# Patient Record
Sex: MALE | Race: OTHER | NOT HISPANIC OR LATINO
[De-identification: names, ages, dates, MRNs, and addresses within clinical notes are randomized per-mention and may not be internally consistent; named-entity substitution may affect disease eponyms.]

---

## 2020-08-05 DIAGNOSIS — R06.89 OTHER ABNORMALITIES OF BREATHING: ICD-10-CM

## 2020-08-05 DIAGNOSIS — Z78.9 OTHER SPECIFIED HEALTH STATUS: ICD-10-CM

## 2020-08-05 RX ORDER — CHROMIUM 200 MCG
TABLET ORAL
Refills: 0 | Status: ACTIVE | COMMUNITY

## 2020-08-05 RX ORDER — ENALAPRIL MALEATE 2.5 MG/1
2.5 TABLET ORAL DAILY
Refills: 0 | Status: ACTIVE | COMMUNITY

## 2020-08-05 RX ORDER — ASPIRIN 81 MG
81 TABLET, DELAYED RELEASE (ENTERIC COATED) ORAL DAILY
Refills: 0 | Status: ACTIVE | COMMUNITY

## 2020-08-05 RX ORDER — METFORMIN HYDROCHLORIDE 1000 MG/1
1000 TABLET, COATED ORAL TWICE DAILY
Refills: 0 | Status: ACTIVE | COMMUNITY

## 2020-08-05 RX ORDER — LEVOTHYROXINE SODIUM 0.05 MG/1
50 TABLET ORAL DAILY
Refills: 0 | Status: ACTIVE | COMMUNITY

## 2020-08-05 RX ORDER — ATORVASTATIN CALCIUM 40 MG/1
40 TABLET, FILM COATED ORAL DAILY
Refills: 0 | Status: ACTIVE | COMMUNITY

## 2020-08-06 ENCOUNTER — NON-APPOINTMENT (OUTPATIENT)
Age: 60
End: 2020-08-06

## 2020-08-06 ENCOUNTER — APPOINTMENT (OUTPATIENT)
Dept: CARDIOLOGY | Facility: CLINIC | Age: 60
End: 2020-08-06
Payer: COMMERCIAL

## 2020-08-06 VITALS
DIASTOLIC BLOOD PRESSURE: 76 MMHG | RESPIRATION RATE: 16 BRPM | TEMPERATURE: 97.4 F | BODY MASS INDEX: 22.96 KG/M2 | HEIGHT: 69 IN | HEART RATE: 69 BPM | WEIGHT: 155 LBS | OXYGEN SATURATION: 100 % | SYSTOLIC BLOOD PRESSURE: 125 MMHG

## 2020-08-06 DIAGNOSIS — E11.9 TYPE 2 DIABETES MELLITUS W/OUT COMPLICATIONS: ICD-10-CM

## 2020-08-06 DIAGNOSIS — Z87.898 PERSONAL HISTORY OF OTHER SPECIFIED CONDITIONS: ICD-10-CM

## 2020-08-06 DIAGNOSIS — Z13.6 ENCOUNTER FOR SCREENING FOR CARDIOVASCULAR DISORDERS: ICD-10-CM

## 2020-08-06 DIAGNOSIS — E78.5 HYPERLIPIDEMIA, UNSPECIFIED: ICD-10-CM

## 2020-08-06 DIAGNOSIS — Z00.00 ENCOUNTER FOR GENERAL ADULT MEDICAL EXAMINATION W/OUT ABNORMAL FINDINGS: ICD-10-CM

## 2020-08-06 DIAGNOSIS — I10 ESSENTIAL (PRIMARY) HYPERTENSION: ICD-10-CM

## 2020-08-06 PROCEDURE — 99205 OFFICE O/P NEW HI 60 MIN: CPT

## 2020-08-06 PROCEDURE — 93000 ELECTROCARDIOGRAM COMPLETE: CPT

## 2020-08-09 PROBLEM — Z00.00 ENCOUNTER FOR PREVENTIVE HEALTH EXAMINATION: Status: ACTIVE | Noted: 2020-07-31

## 2020-08-09 PROBLEM — I10 BENIGN HYPERTENSION: Status: ACTIVE | Noted: 2020-08-09

## 2020-08-09 PROBLEM — E11.9 DIABETES: Status: ACTIVE | Noted: 2020-08-05

## 2020-08-09 PROBLEM — E78.5 HYPERLIPIDEMIA: Status: ACTIVE | Noted: 2020-08-09

## 2020-08-09 PROBLEM — Z13.6 SCREENING FOR CARDIOVASCULAR CONDITION: Status: ACTIVE | Noted: 2020-08-09

## 2020-08-09 PROBLEM — Z87.898 H/O SHORTNESS OF BREATH: Status: ACTIVE | Noted: 2020-08-05

## 2020-08-27 ENCOUNTER — APPOINTMENT (OUTPATIENT)
Dept: CV DIAGNOSTICS | Facility: HOSPITAL | Age: 60
End: 2020-08-27

## 2020-08-27 ENCOUNTER — APPOINTMENT (OUTPATIENT)
Dept: CV DIAGNOSITCS | Facility: HOSPITAL | Age: 60
End: 2020-08-27

## 2021-08-13 ENCOUNTER — INPATIENT (INPATIENT)
Facility: HOSPITAL | Age: 61
LOS: 4 days | Discharge: HOME | DRG: 643 | End: 2021-08-18
Attending: STUDENT IN AN ORGANIZED HEALTH CARE EDUCATION/TRAINING PROGRAM | Admitting: STUDENT IN AN ORGANIZED HEALTH CARE EDUCATION/TRAINING PROGRAM
Payer: COMMERCIAL

## 2021-08-13 VITALS
HEART RATE: 73 BPM | RESPIRATION RATE: 17 BRPM | HEIGHT: 70 IN | TEMPERATURE: 98 F | SYSTOLIC BLOOD PRESSURE: 128 MMHG | WEIGHT: 145.95 LBS | OXYGEN SATURATION: 98 % | DIASTOLIC BLOOD PRESSURE: 67 MMHG

## 2021-08-13 DIAGNOSIS — E87.1 HYPO-OSMOLALITY AND HYPONATREMIA: ICD-10-CM

## 2021-08-13 LAB
ALBUMIN SERPL ELPH-MCNC: 3 G/DL — LOW (ref 3.3–5)
ALP SERPL-CCNC: 84 U/L — SIGNIFICANT CHANGE UP (ref 40–120)
ALT FLD-CCNC: 36 U/L — SIGNIFICANT CHANGE UP (ref 12–78)
ANION GAP SERPL CALC-SCNC: 8 MMOL/L — SIGNIFICANT CHANGE UP (ref 5–17)
APPEARANCE UR: CLEAR — SIGNIFICANT CHANGE UP
AST SERPL-CCNC: 28 U/L — SIGNIFICANT CHANGE UP (ref 15–37)
BACTERIA # UR AUTO: ABNORMAL
BASOPHILS # BLD AUTO: 0.06 K/UL — SIGNIFICANT CHANGE UP (ref 0–0.2)
BASOPHILS NFR BLD AUTO: 0.9 % — SIGNIFICANT CHANGE UP (ref 0–2)
BILIRUB SERPL-MCNC: 0.5 MG/DL — SIGNIFICANT CHANGE UP (ref 0.2–1.2)
BILIRUB UR-MCNC: NEGATIVE — SIGNIFICANT CHANGE UP
BUN SERPL-MCNC: 11 MG/DL — SIGNIFICANT CHANGE UP (ref 7–23)
CALCIUM SERPL-MCNC: 7.9 MG/DL — LOW (ref 8.5–10.1)
CHLORIDE SERPL-SCNC: 87 MMOL/L — LOW (ref 96–108)
CO2 SERPL-SCNC: 21 MMOL/L — LOW (ref 22–31)
COLOR SPEC: YELLOW — SIGNIFICANT CHANGE UP
CREAT SERPL-MCNC: 0.54 MG/DL — SIGNIFICANT CHANGE UP (ref 0.5–1.3)
DIFF PNL FLD: ABNORMAL
EOSINOPHIL # BLD AUTO: 0.45 K/UL — SIGNIFICANT CHANGE UP (ref 0–0.5)
EOSINOPHIL NFR BLD AUTO: 6.9 % — HIGH (ref 0–6)
EPI CELLS # UR: SIGNIFICANT CHANGE UP
GLUCOSE SERPL-MCNC: 130 MG/DL — HIGH (ref 70–99)
GLUCOSE UR QL: NEGATIVE — SIGNIFICANT CHANGE UP
HCT VFR BLD CALC: 28.6 % — LOW (ref 39–50)
HGB BLD-MCNC: 10.5 G/DL — LOW (ref 13–17)
IMM GRANULOCYTES NFR BLD AUTO: 0.3 % — SIGNIFICANT CHANGE UP (ref 0–1.5)
KETONES UR-MCNC: NEGATIVE — SIGNIFICANT CHANGE UP
LEUKOCYTE ESTERASE UR-ACNC: NEGATIVE — SIGNIFICANT CHANGE UP
LIDOCAIN IGE QN: 65 U/L — LOW (ref 73–393)
LYMPHOCYTES # BLD AUTO: 2.98 K/UL — SIGNIFICANT CHANGE UP (ref 1–3.3)
LYMPHOCYTES # BLD AUTO: 45.9 % — HIGH (ref 13–44)
MCHC RBC-ENTMCNC: 28.5 PG — SIGNIFICANT CHANGE UP (ref 27–34)
MCHC RBC-ENTMCNC: 36.7 GM/DL — HIGH (ref 32–36)
MCV RBC AUTO: 77.7 FL — LOW (ref 80–100)
MONOCYTES # BLD AUTO: 0.7 K/UL — SIGNIFICANT CHANGE UP (ref 0–0.9)
MONOCYTES NFR BLD AUTO: 10.8 % — SIGNIFICANT CHANGE UP (ref 2–14)
NEUTROPHILS # BLD AUTO: 2.28 K/UL — SIGNIFICANT CHANGE UP (ref 1.8–7.4)
NEUTROPHILS NFR BLD AUTO: 35.2 % — LOW (ref 43–77)
NITRITE UR-MCNC: NEGATIVE — SIGNIFICANT CHANGE UP
NRBC # BLD: 0 /100 WBCS — SIGNIFICANT CHANGE UP (ref 0–0)
PH UR: 6.5 — SIGNIFICANT CHANGE UP (ref 5–8)
PLATELET # BLD AUTO: 213 K/UL — SIGNIFICANT CHANGE UP (ref 150–400)
POTASSIUM SERPL-MCNC: 3.9 MMOL/L — SIGNIFICANT CHANGE UP (ref 3.5–5.3)
POTASSIUM SERPL-SCNC: 3.9 MMOL/L — SIGNIFICANT CHANGE UP (ref 3.5–5.3)
PROT SERPL-MCNC: 6.8 G/DL — SIGNIFICANT CHANGE UP (ref 6–8.3)
PROT UR-MCNC: NEGATIVE — SIGNIFICANT CHANGE UP
RBC # BLD: 3.68 M/UL — LOW (ref 4.2–5.8)
RBC # FLD: 12.7 % — SIGNIFICANT CHANGE UP (ref 10.3–14.5)
RBC CASTS # UR COMP ASSIST: SIGNIFICANT CHANGE UP /HPF (ref 0–4)
SODIUM SERPL-SCNC: 116 MMOL/L — CRITICAL LOW (ref 135–145)
SP GR SPEC: 1.01 — SIGNIFICANT CHANGE UP (ref 1.01–1.02)
UROBILINOGEN FLD QL: NEGATIVE — SIGNIFICANT CHANGE UP
WBC # BLD: 6.49 K/UL — SIGNIFICANT CHANGE UP (ref 3.8–10.5)
WBC # FLD AUTO: 6.49 K/UL — SIGNIFICANT CHANGE UP (ref 3.8–10.5)
WBC UR QL: SIGNIFICANT CHANGE UP

## 2021-08-13 PROCEDURE — 99285 EMERGENCY DEPT VISIT HI MDM: CPT

## 2021-08-13 PROCEDURE — 99223 1ST HOSP IP/OBS HIGH 75: CPT | Mod: GC

## 2021-08-13 PROCEDURE — 71046 X-RAY EXAM CHEST 2 VIEWS: CPT | Mod: 26

## 2021-08-13 PROCEDURE — 93010 ELECTROCARDIOGRAM REPORT: CPT

## 2021-08-13 RX ORDER — FAMOTIDINE 10 MG/ML
20 INJECTION INTRAVENOUS ONCE
Refills: 0 | Status: COMPLETED | OUTPATIENT
Start: 2021-08-13 | End: 2021-08-13

## 2021-08-13 RX ORDER — SODIUM CHLORIDE 9 MG/ML
1000 INJECTION INTRAMUSCULAR; INTRAVENOUS; SUBCUTANEOUS ONCE
Refills: 0 | Status: COMPLETED | OUTPATIENT
Start: 2021-08-13 | End: 2021-08-13

## 2021-08-13 RX ORDER — ONDANSETRON 8 MG/1
4 TABLET, FILM COATED ORAL ONCE
Refills: 0 | Status: COMPLETED | OUTPATIENT
Start: 2021-08-13 | End: 2021-08-13

## 2021-08-13 RX ADMIN — SODIUM CHLORIDE 1000 MILLILITER(S): 9 INJECTION INTRAMUSCULAR; INTRAVENOUS; SUBCUTANEOUS at 22:04

## 2021-08-13 RX ADMIN — FAMOTIDINE 20 MILLIGRAM(S): 10 INJECTION INTRAVENOUS at 22:03

## 2021-08-13 RX ADMIN — ONDANSETRON 4 MILLIGRAM(S): 8 TABLET, FILM COATED ORAL at 22:03

## 2021-08-13 NOTE — ED PROVIDER NOTE - PROGRESS NOTE DETAILS
All results were explained to patient and/or family and a copy of all available results given.  case dw Magaly

## 2021-08-13 NOTE — ED PROVIDER NOTE - OBJECTIVE STATEMENT
Pt wants wife to translate.  Pt was started on Reglan and Pepcid last night urgent care.  Decreased PO intake x 3 days.  Nausea, but no vomiting.  Watery diarrhea x 6 today x yesterday.  No sick contacts, recent ab, hospitalization, travel outside [place].   No prior episodes.

## 2021-08-14 DIAGNOSIS — G93.41 METABOLIC ENCEPHALOPATHY: ICD-10-CM

## 2021-08-14 DIAGNOSIS — E11.9 TYPE 2 DIABETES MELLITUS WITHOUT COMPLICATIONS: ICD-10-CM

## 2021-08-14 DIAGNOSIS — R41.82 ALTERED MENTAL STATUS, UNSPECIFIED: ICD-10-CM

## 2021-08-14 DIAGNOSIS — D50.9 IRON DEFICIENCY ANEMIA, UNSPECIFIED: ICD-10-CM

## 2021-08-14 DIAGNOSIS — R19.7 DIARRHEA, UNSPECIFIED: ICD-10-CM

## 2021-08-14 DIAGNOSIS — D64.9 ANEMIA, UNSPECIFIED: ICD-10-CM

## 2021-08-14 DIAGNOSIS — E03.9 HYPOTHYROIDISM, UNSPECIFIED: ICD-10-CM

## 2021-08-14 DIAGNOSIS — E87.1 HYPO-OSMOLALITY AND HYPONATREMIA: ICD-10-CM

## 2021-08-14 DIAGNOSIS — Z29.9 ENCOUNTER FOR PROPHYLACTIC MEASURES, UNSPECIFIED: ICD-10-CM

## 2021-08-14 LAB
ALBUMIN SERPL ELPH-MCNC: 3.3 G/DL — SIGNIFICANT CHANGE UP (ref 3.3–5)
ALP SERPL-CCNC: 86 U/L — SIGNIFICANT CHANGE UP (ref 40–120)
ALT FLD-CCNC: 37 U/L — SIGNIFICANT CHANGE UP (ref 12–78)
ANION GAP SERPL CALC-SCNC: 11 MMOL/L — SIGNIFICANT CHANGE UP (ref 5–17)
ANION GAP SERPL CALC-SCNC: 8 MMOL/L — SIGNIFICANT CHANGE UP (ref 5–17)
ANION GAP SERPL CALC-SCNC: 9 MMOL/L — SIGNIFICANT CHANGE UP (ref 5–17)
AST SERPL-CCNC: 35 U/L — SIGNIFICANT CHANGE UP (ref 15–37)
BILIRUB SERPL-MCNC: 0.8 MG/DL — SIGNIFICANT CHANGE UP (ref 0.2–1.2)
BUN SERPL-MCNC: 10 MG/DL — SIGNIFICANT CHANGE UP (ref 7–23)
BUN SERPL-MCNC: 8 MG/DL — SIGNIFICANT CHANGE UP (ref 7–23)
BUN SERPL-MCNC: 9 MG/DL — SIGNIFICANT CHANGE UP (ref 7–23)
CALCIUM SERPL-MCNC: 7.9 MG/DL — LOW (ref 8.5–10.1)
CALCIUM SERPL-MCNC: 7.9 MG/DL — LOW (ref 8.5–10.1)
CALCIUM SERPL-MCNC: 8 MG/DL — LOW (ref 8.5–10.1)
CALCIUM SERPL-MCNC: 8 MG/DL — LOW (ref 8.5–10.1)
CALCIUM SERPL-MCNC: 8.1 MG/DL — LOW (ref 8.5–10.1)
CHLORIDE SERPL-SCNC: 89 MMOL/L — LOW (ref 96–108)
CHLORIDE SERPL-SCNC: 91 MMOL/L — LOW (ref 96–108)
CHLORIDE UR-SCNC: 83 MMOL/L — SIGNIFICANT CHANGE UP
CO2 SERPL-SCNC: 19 MMOL/L — LOW (ref 22–31)
CO2 SERPL-SCNC: 22 MMOL/L — SIGNIFICANT CHANGE UP (ref 22–31)
CREAT SERPL-MCNC: 0.51 MG/DL — SIGNIFICANT CHANGE UP (ref 0.5–1.3)
CREAT SERPL-MCNC: 0.59 MG/DL — SIGNIFICANT CHANGE UP (ref 0.5–1.3)
CREAT SERPL-MCNC: 0.61 MG/DL — SIGNIFICANT CHANGE UP (ref 0.5–1.3)
CREAT SERPL-MCNC: 0.72 MG/DL — SIGNIFICANT CHANGE UP (ref 0.5–1.3)
CREAT SERPL-MCNC: 0.8 MG/DL — SIGNIFICANT CHANGE UP (ref 0.5–1.3)
GLUCOSE SERPL-MCNC: 103 MG/DL — HIGH (ref 70–99)
GLUCOSE SERPL-MCNC: 78 MG/DL — SIGNIFICANT CHANGE UP (ref 70–99)
GLUCOSE SERPL-MCNC: 94 MG/DL — SIGNIFICANT CHANGE UP (ref 70–99)
GLUCOSE SERPL-MCNC: 96 MG/DL — SIGNIFICANT CHANGE UP (ref 70–99)
GLUCOSE SERPL-MCNC: 99 MG/DL — SIGNIFICANT CHANGE UP (ref 70–99)
HCT VFR BLD CALC: 30.2 % — LOW (ref 39–50)
HCV AB S/CO SERPL IA: 0.12 S/CO — SIGNIFICANT CHANGE UP (ref 0–0.99)
HCV AB SERPL-IMP: SIGNIFICANT CHANGE UP
HGB BLD-MCNC: 11.2 G/DL — LOW (ref 13–17)
LACTATE SERPL-SCNC: 0.5 MMOL/L — LOW (ref 0.7–2)
MAGNESIUM SERPL-MCNC: 2 MG/DL — SIGNIFICANT CHANGE UP (ref 1.6–2.6)
MCHC RBC-ENTMCNC: 28.9 PG — SIGNIFICANT CHANGE UP (ref 27–34)
MCHC RBC-ENTMCNC: 37.1 GM/DL — HIGH (ref 32–36)
MCV RBC AUTO: 77.8 FL — LOW (ref 80–100)
NRBC # BLD: 0 /100 WBCS — SIGNIFICANT CHANGE UP (ref 0–0)
OSMOLALITY SERPL: 239 MOSMOL/KG — LOW (ref 280–301)
OSMOLALITY UR: 248 MOSM/KG — SIGNIFICANT CHANGE UP (ref 50–1200)
PHOSPHATE SERPL-MCNC: 2.4 MG/DL — LOW (ref 2.5–4.5)
PLATELET # BLD AUTO: 234 K/UL — SIGNIFICANT CHANGE UP (ref 150–400)
POTASSIUM SERPL-MCNC: 3.3 MMOL/L — LOW (ref 3.5–5.3)
POTASSIUM SERPL-MCNC: 3.5 MMOL/L — SIGNIFICANT CHANGE UP (ref 3.5–5.3)
POTASSIUM SERPL-MCNC: 3.5 MMOL/L — SIGNIFICANT CHANGE UP (ref 3.5–5.3)
POTASSIUM SERPL-MCNC: 3.7 MMOL/L — SIGNIFICANT CHANGE UP (ref 3.5–5.3)
POTASSIUM SERPL-MCNC: 3.8 MMOL/L — SIGNIFICANT CHANGE UP (ref 3.5–5.3)
POTASSIUM SERPL-SCNC: 3.3 MMOL/L — LOW (ref 3.5–5.3)
POTASSIUM SERPL-SCNC: 3.5 MMOL/L — SIGNIFICANT CHANGE UP (ref 3.5–5.3)
POTASSIUM SERPL-SCNC: 3.5 MMOL/L — SIGNIFICANT CHANGE UP (ref 3.5–5.3)
POTASSIUM SERPL-SCNC: 3.7 MMOL/L — SIGNIFICANT CHANGE UP (ref 3.5–5.3)
POTASSIUM SERPL-SCNC: 3.8 MMOL/L — SIGNIFICANT CHANGE UP (ref 3.5–5.3)
PROT SERPL-MCNC: 7 G/DL — SIGNIFICANT CHANGE UP (ref 6–8.3)
RAPID RVP RESULT: DETECTED
RBC # BLD: 3.88 M/UL — LOW (ref 4.2–5.8)
RBC # FLD: 12.6 % — SIGNIFICANT CHANGE UP (ref 10.3–14.5)
RV+EV RNA SPEC QL NAA+PROBE: DETECTED
SARS-COV-2 RNA SPEC QL NAA+PROBE: SIGNIFICANT CHANGE UP
SODIUM SERPL-SCNC: 119 MMOL/L — CRITICAL LOW (ref 135–145)
SODIUM SERPL-SCNC: 120 MMOL/L — CRITICAL LOW (ref 135–145)
SODIUM SERPL-SCNC: 121 MMOL/L — LOW (ref 135–145)
SODIUM UR-SCNC: 97 MMOL/L — SIGNIFICANT CHANGE UP
TSH SERPL-MCNC: 2.32 UIU/ML — SIGNIFICANT CHANGE UP (ref 0.36–3.74)
URATE SERPL-MCNC: 2.6 MG/DL — LOW (ref 3.4–8.8)
URATE UR-MCNC: 7.5 MG/DL — SIGNIFICANT CHANGE UP
WBC # BLD: 7 K/UL — SIGNIFICANT CHANGE UP (ref 3.8–10.5)
WBC # FLD AUTO: 7 K/UL — SIGNIFICANT CHANGE UP (ref 3.8–10.5)

## 2021-08-14 PROCEDURE — 70450 CT HEAD/BRAIN W/O DYE: CPT | Mod: 26

## 2021-08-14 PROCEDURE — 99291 CRITICAL CARE FIRST HOUR: CPT

## 2021-08-14 PROCEDURE — 99233 SBSQ HOSP IP/OBS HIGH 50: CPT | Mod: GC

## 2021-08-14 RX ORDER — SODIUM CHLORIDE 9 MG/ML
1000 INJECTION, SOLUTION INTRAVENOUS
Refills: 0 | Status: DISCONTINUED | OUTPATIENT
Start: 2021-08-14 | End: 2021-08-18

## 2021-08-14 RX ORDER — POTASSIUM PHOSPHATE, MONOBASIC POTASSIUM PHOSPHATE, DIBASIC 236; 224 MG/ML; MG/ML
15 INJECTION, SOLUTION INTRAVENOUS ONCE
Refills: 0 | Status: COMPLETED | OUTPATIENT
Start: 2021-08-14 | End: 2021-08-14

## 2021-08-14 RX ORDER — DEXTROSE 50 % IN WATER 50 %
25 SYRINGE (ML) INTRAVENOUS ONCE
Refills: 0 | Status: DISCONTINUED | OUTPATIENT
Start: 2021-08-14 | End: 2021-08-18

## 2021-08-14 RX ORDER — ACETAMINOPHEN 500 MG
975 TABLET ORAL ONCE
Refills: 0 | Status: COMPLETED | OUTPATIENT
Start: 2021-08-14 | End: 2021-08-14

## 2021-08-14 RX ORDER — LEVOTHYROXINE SODIUM 125 MCG
75 TABLET ORAL DAILY
Refills: 0 | Status: DISCONTINUED | OUTPATIENT
Start: 2021-08-14 | End: 2021-08-15

## 2021-08-14 RX ORDER — INSULIN LISPRO 100/ML
VIAL (ML) SUBCUTANEOUS
Refills: 0 | Status: DISCONTINUED | OUTPATIENT
Start: 2021-08-14 | End: 2021-08-18

## 2021-08-14 RX ORDER — METFORMIN HYDROCHLORIDE 850 MG/1
1 TABLET ORAL
Qty: 0 | Refills: 0 | DISCHARGE

## 2021-08-14 RX ORDER — GLUCAGON INJECTION, SOLUTION 0.5 MG/.1ML
1 INJECTION, SOLUTION SUBCUTANEOUS ONCE
Refills: 0 | Status: DISCONTINUED | OUTPATIENT
Start: 2021-08-14 | End: 2021-08-18

## 2021-08-14 RX ORDER — DESMOPRESSIN ACETATE 0.1 MG/1
2 TABLET ORAL ONCE
Refills: 0 | Status: COMPLETED | OUTPATIENT
Start: 2021-08-14 | End: 2021-08-14

## 2021-08-14 RX ORDER — FERROUS SULFATE 325(65) MG
1 TABLET ORAL
Qty: 0 | Refills: 0 | DISCHARGE

## 2021-08-14 RX ORDER — SODIUM CHLORIDE 9 MG/ML
2 INJECTION INTRAMUSCULAR; INTRAVENOUS; SUBCUTANEOUS EVERY 6 HOURS
Refills: 0 | Status: DISCONTINUED | OUTPATIENT
Start: 2021-08-14 | End: 2021-08-14

## 2021-08-14 RX ORDER — SODIUM CHLORIDE 9 MG/ML
1000 INJECTION INTRAMUSCULAR; INTRAVENOUS; SUBCUTANEOUS
Refills: 0 | Status: DISCONTINUED | OUTPATIENT
Start: 2021-08-14 | End: 2021-08-14

## 2021-08-14 RX ORDER — ENOXAPARIN SODIUM 100 MG/ML
40 INJECTION SUBCUTANEOUS DAILY
Refills: 0 | Status: DISCONTINUED | OUTPATIENT
Start: 2021-08-14 | End: 2021-08-18

## 2021-08-14 RX ORDER — POTASSIUM CHLORIDE 20 MEQ
10 PACKET (EA) ORAL
Refills: 0 | Status: COMPLETED | OUTPATIENT
Start: 2021-08-14 | End: 2021-08-15

## 2021-08-14 RX ORDER — SODIUM CHLORIDE 5 G/100ML
500 INJECTION, SOLUTION INTRAVENOUS
Refills: 0 | Status: DISCONTINUED | OUTPATIENT
Start: 2021-08-14 | End: 2021-08-15

## 2021-08-14 RX ORDER — DEXMEDETOMIDINE HYDROCHLORIDE IN 0.9% SODIUM CHLORIDE 4 UG/ML
0.2 INJECTION INTRAVENOUS
Qty: 200 | Refills: 0 | Status: DISCONTINUED | OUTPATIENT
Start: 2021-08-14 | End: 2021-08-14

## 2021-08-14 RX ORDER — ACETAMINOPHEN 500 MG
650 TABLET ORAL ONCE
Refills: 0 | Status: COMPLETED | OUTPATIENT
Start: 2021-08-14 | End: 2021-08-14

## 2021-08-14 RX ORDER — SODIUM CHLORIDE 9 MG/ML
1000 INJECTION, SOLUTION INTRAVENOUS
Refills: 0 | Status: DISCONTINUED | OUTPATIENT
Start: 2021-08-14 | End: 2021-08-14

## 2021-08-14 RX ORDER — DEXTROSE 50 % IN WATER 50 %
12.5 SYRINGE (ML) INTRAVENOUS ONCE
Refills: 0 | Status: DISCONTINUED | OUTPATIENT
Start: 2021-08-14 | End: 2021-08-18

## 2021-08-14 RX ORDER — PREGABALIN 225 MG/1
1 CAPSULE ORAL
Qty: 0 | Refills: 0 | DISCHARGE

## 2021-08-14 RX ORDER — DEXTROSE 50 % IN WATER 50 %
15 SYRINGE (ML) INTRAVENOUS ONCE
Refills: 0 | Status: DISCONTINUED | OUTPATIENT
Start: 2021-08-14 | End: 2021-08-18

## 2021-08-14 RX ADMIN — Medication 0.25 MILLIGRAM(S): at 00:44

## 2021-08-14 RX ADMIN — DEXMEDETOMIDINE HYDROCHLORIDE IN 0.9% SODIUM CHLORIDE 3.45 MICROGRAM(S)/KG/HR: 4 INJECTION INTRAVENOUS at 13:22

## 2021-08-14 RX ADMIN — Medication 100 MILLIEQUIVALENT(S): at 23:21

## 2021-08-14 RX ADMIN — ENOXAPARIN SODIUM 40 MILLIGRAM(S): 100 INJECTION SUBCUTANEOUS at 11:40

## 2021-08-14 RX ADMIN — Medication 650 MILLIGRAM(S): at 10:00

## 2021-08-14 RX ADMIN — Medication 650 MILLIGRAM(S): at 19:15

## 2021-08-14 RX ADMIN — POTASSIUM PHOSPHATE, MONOBASIC POTASSIUM PHOSPHATE, DIBASIC 62.5 MILLIMOLE(S): 236; 224 INJECTION, SOLUTION INTRAVENOUS at 23:21

## 2021-08-14 RX ADMIN — SODIUM CHLORIDE 30 MILLILITER(S): 5 INJECTION, SOLUTION INTRAVENOUS at 19:41

## 2021-08-14 RX ADMIN — Medication 1 MILLIGRAM(S): at 12:23

## 2021-08-14 RX ADMIN — DEXMEDETOMIDINE HYDROCHLORIDE IN 0.9% SODIUM CHLORIDE 3.45 MICROGRAM(S)/KG/HR: 4 INJECTION INTRAVENOUS at 15:26

## 2021-08-14 RX ADMIN — DESMOPRESSIN ACETATE 2 MICROGRAM(S): 0.1 TABLET ORAL at 05:41

## 2021-08-14 RX ADMIN — SODIUM CHLORIDE 120 MILLILITER(S): 9 INJECTION INTRAMUSCULAR; INTRAVENOUS; SUBCUTANEOUS at 01:07

## 2021-08-14 RX ADMIN — Medication 650 MILLIGRAM(S): at 09:00

## 2021-08-14 RX ADMIN — Medication 1 MILLIGRAM(S): at 13:38

## 2021-08-14 RX ADMIN — Medication 650 MILLIGRAM(S): at 20:15

## 2021-08-14 RX ADMIN — SODIUM CHLORIDE 50 MILLILITER(S): 9 INJECTION, SOLUTION INTRAVENOUS at 03:01

## 2021-08-14 RX ADMIN — DEXMEDETOMIDINE HYDROCHLORIDE IN 0.9% SODIUM CHLORIDE 3.45 MICROGRAM(S)/KG/HR: 4 INJECTION INTRAVENOUS at 11:40

## 2021-08-14 RX ADMIN — DEXMEDETOMIDINE HYDROCHLORIDE IN 0.9% SODIUM CHLORIDE 3.45 MICROGRAM(S)/KG/HR: 4 INJECTION INTRAVENOUS at 09:53

## 2021-08-14 NOTE — H&P ADULT - NSHPPHYSICALEXAM_GEN_ALL_CORE
T(C): 36.9 (08-13-21 @ 20:43), Max: 36.9 (08-13-21 @ 20:43)  HR: 80 (08-13-21 @ 23:15) (73 - 80)  BP: 136/71 (08-13-21 @ 23:15) (128/67 - 136/71)  RR: 18 (08-13-21 @ 23:15) (17 - 18)  SpO2: 99% (08-13-21 @ 23:15) (98% - 99%)    GENERAL: patient appears confused, restless--constantly moving and unable to stay still throughout exam  EYES: sclera clear, no exudates  ENMT: oropharynx clear without erythema, no exudates, dry mucous membranes  LUNGS: clear to auscultation bilaterally, no wheezing or rhonchi appreciated  HEART: soft S1/S2, regular rate and rhythm, no murmurs noted, no lower extremity edema  GASTROINTESTINAL: abdomen is soft, nontender, nondistended, normoactive bowel sounds  INTEGUMENT: good skin turgor, warm skin, appears well perfused  MUSCULOSKELETAL: no clubbing or cyanosis, no obvious deformity  NEUROLOGIC: awake, alert, oriented x1-2(Oriented only to person), good muscle tone in all 4 extremities  HEME/LYMPH: no obvious ecchymosis or petechiae T(C): 36.9 (08-13-21 @ 20:43), Max: 36.9 (08-13-21 @ 20:43)  HR: 80 (08-13-21 @ 23:15) (73 - 80)  BP: 136/71 (08-13-21 @ 23:15) (128/67 - 136/71)  RR: 18 (08-13-21 @ 23:15) (17 - 18)  SpO2: 99% (08-13-21 @ 23:15) (98% - 99%)    GENERAL: patient appears confused, restless--constantly moving and unable to stay still throughout exam  EYES: sclera clear, no exudates  ENMT: oropharynx clear without erythema, no exudates, dry mucous membranes  LUNGS: clear to auscultation bilaterally, no wheezing or rhonchi appreciated  HEART: soft S1/S2, regular rate and rhythm, no murmurs noted, no lower extremity edema  GASTROINTESTINAL: abdomen is soft, nontender, nondistended, normoactive bowel sounds  INTEGUMENT: good skin turgor, warm skin, appears well perfused  MUSCULOSKELETAL: no clubbing or cyanosis, no obvious deformity  NEUROLOGIC: awake, alert, oriented x1-2(Oriented only to person), good muscle tone in all 4 extremities; CN II-XII intact b/l  HEME/LYMPH: no obvious ecchymosis or petechiae T(C): 36.9 (08-13-21 @ 20:43), Max: 36.9 (08-13-21 @ 20:43)  HR: 80 (08-13-21 @ 23:15) (73 - 80)  BP: 136/71 (08-13-21 @ 23:15) (128/67 - 136/71)  RR: 18 (08-13-21 @ 23:15) (17 - 18)  SpO2: 99% (08-13-21 @ 23:15) (98% - 99%)    GENERAL: patient appears confused, restless--constantly moving and unable to stay still throughout exam  EYES: sclera clear, no exudates  ENMT: oropharynx clear without erythema, no exudates, dry mucous membranes  LUNGS: clear to auscultation bilaterally, no wheezing or rhonchi appreciated  HEART: soft S1/S2, regular rate and rhythm, no murmurs noted, no lower extremity edema  GASTROINTESTINAL: abdomen is soft, nontender, nondistended, normoactive bowel sounds  INTEGUMENT: good skin turgor, warm skin, appears well perfused  MUSCULOSKELETAL: no clubbing or cyanosis, no obvious deformity  NEUROLOGIC: awake, alert, oriented x1-2(Oriented only to person), good muscle tone in all 4 extremities; no focal deficits  HEME/LYMPH: no obvious ecchymosis or petechiae

## 2021-08-14 NOTE — PROGRESS NOTE ADULT - PROBLEM SELECTOR PLAN 1
pt with 6 episodes of loose, non-formed stool, decreased appetite on admission   - Na 116 on admission, symptomatic hyponatremia likely in setting of overt volume loss and poor PO intake vs. SIADH   - Enterovirus positive--likely etiology of symptoms; contact and droplet precaution   - GI PCR, stool culture, C diff pcr ordered, f/u results   - Na 116-->119-->120 today   - s/p 1L ns bolus x 1 on admission, s/p LR, s/p DDAVP   - Start NaCl tablets q6h x2 doses   - F/u BMP at 20:00   - Avoid rapid correction. Goal correction of 8-10 meq/24 hours.   - Nephro Dr. Zarate following, recs appreciated  - Management as per ICU

## 2021-08-14 NOTE — H&P ADULT - PROBLEM SELECTOR PLAN 1
pt with 6 episodes of loose, non-formed stool, decreased appetite  - Na 116 on admission, admit for hyponatremia likely in setting of overt volume loss and poor PO intake  - s/p 1L ns bolus x 1 on admission, check repeat BMP  - Start LR @100cc/hr  - Nephro consult pt with 6 episodes of loose, non-formed stool, decreased appetite  - Na 116 on admission, admit to ICU for metabolic encephalopathy likely 2/2 hyponatremia likely in setting of overt volume loss and poor PO intake  - Enterovirus positive--likely cause of symptoms  - check GI PCR, stool culture, C diff pcr   - s/p 1L ns bolus x 1 on admission, check repeat BMP  - On LR @100cc/hr  - Management per ICU pt with 6 episodes of loose, non-formed stool, decreased appetite  - Na 116 on admission, admit to ICU for metabolic encephalopathy likely 2/2 hyponatremia likely in setting of overt volume loss and poor PO intake  - Enterovirus positive--likely culprit of symptoms  - check GI PCR, stool culture, C diff pcr   - s/p 1L ns bolus x 1 on admission, check repeat BMP  - On LR @100cc/hr  - Management per ICU pt with 6 episodes of loose, non-formed stool, decreased appetite  - Na 116 on admission, admit to ICU for metabolic encephalopathy likely 2/2 hyponatremia likely in setting of overt volume loss and poor PO intake  - Enterovirus positive--likely culprit of symptoms  - check GI PCR, stool culture, C diff pcr   - s/p 1L ns bolus x 1 on admission, check repeat BMP  - On LR @50cc/hr  - Management per ICU pt with 6 episodes of loose, non-formed stool, decreased appetite  - Na 116 on admission, admit to ICU for symptomatic hyponatremia likely in setting of overt volume loss and poor PO intake  - Enterovirus positive--likely etiology of symptoms  -  GI PCR, stool culture, C diff pcr pending  - s/p 1L ns bolus x 1 on admission, check repeat BMP  - Na increase of 3 pts s/p 1 L NS. c/w gentle hydration. avoid rapid correction.   - check urine lytes/osmol  - On LR @50cc/hr

## 2021-08-14 NOTE — H&P ADULT - HISTORY OF PRESENT ILLNESS
62 y/o M with PMHx of T2DM, Iron deficiency anemia, Hypothyroidism presenting with hyponatremia. History as per wife, Mario Rene at bedside as pt is AAOx1-2(Unsure where is is and the year). Per wife, patient has been "feeling unwell" with  poor PO intake and fatigue for the past 3 days. Today, he reportedly had 6 episodes of loose, brown colored non-formed stool. Denies any recent antibiotic use. No recent travel or sick contacts.     ED Course:  Vitals:  Labs:   62 y/o M with PMHx of T2DM, Iron deficiency anemia, Hypothyroidism presenting with hyponatremia. History as per wife, Mario Rene at bedside as pt is AAOx1-2(Unsure where is is and the year). Per wife, patient has been "feeling unwell" with  poor PO intake and fatigue for the past 3 days. Today, he reportedly had 6 episodes of loose, brown colored non-formed stool. Denies any recent antibiotic use. No recent travel or sick contacts.     ED Course: s/p pepcid 20mg IVP x 1, Zofran 4mg IVP x 1, 1L ns bolus x 1  Vitals: T 98.4, HR 73, /67, O2 98 on RA  Labs:   60 y/o M with PMHx of T2DM, Iron deficiency anemia, Hypothyroidism presenting with hyponatremia. History as per wife, Mario Rene at bedside as pt is AAOx1-2(Unsure where is is and the year). Wife knows this is not the patient's baseline and believes he is confused/agitated because "he just wants to go home". Per wife, patient has been "feeling unwell" with nausea, poor PO intake, and fatigue for the past 3 days. Pt had visited an urgent care and was prescribed some pepcid and reglan, which provided mild relief. Today, he reportedly had 6 episodes of loose, brown colored non-formed stool. Denies any recent antibiotic use. No recent travel or sick contacts.     ED Course: s/p pepcid 20mg IVP x 1, Zofran 4mg IVP x 1, 1L ns bolus x 1  Vitals: T 98.4, HR 73, /67, O2 98 on RA  Labs: Hb 10.5, MCV 77.7, Na 116, CO2 21, , Ca 7.9, Alb 3.0, Lipase 65, Enterovirus positive  CXR:    62 y/o M with PMHx of T2DM, Iron deficiency anemia, Hypothyroidism presenting with hyponatremia. History as per wife, Mario Rene at bedside as pt is AAOx1-2(Unsure where is is and the year). Wife knows this is not the patient's baseline and believes he is confused/agitated because "he just wants to go home". Per wife, patient has been "feeling unwell" with nausea, poor PO intake, and fatigue for the past 3 days. Pt had visited an urgent care and was prescribed some pepcid and reglan, which provided mild relief. Today, he reportedly had 6 episodes of loose, brown colored non-formed stool. Denies any recent antibiotic use. No recent travel or sick contacts.     ED Course: s/p pepcid 20mg IVP x 1, Zofran 4mg IVP x 1, 1L ns bolus x 1  Vitals: T 98.4, HR 73, /67, O2 98 on RA  Labs: Hb 10.5, MCV 77.7, Na 116, CO2 21, , Ca 7.9, Alb 3.0, Lipase 65, Enterovirus positive  CXR(pre-tello read): Lungs appear clear

## 2021-08-14 NOTE — DIETITIAN INITIAL EVALUATION ADULT. - PROBLEM SELECTOR PLAN 3
Chronic, per wife pt takes iron daily at home  - Hb 10.5 on admission, microcytic  - Continue home Iron 325mg qd  - monitor for s/sx of bleed  - Management per ICU

## 2021-08-14 NOTE — CONSULT NOTE ADULT - ASSESSMENT
Hyponatremia: Likely SIADH  Hypertension  Change in mental status  Diabetes    Chart reviewed. s/p DDAVP. Improving sodium trend at acceptable rate. Goal correction of 8-10 meq/24 hours.   Monitor BP trend. Titrate BP meds as needed. Monitor blood sugar levels. Insulin coverage as needed. D/w family at bedside. D/w ICU team.   Further recommendations pending clinical course. Thank you for the courtesy of this referral.

## 2021-08-14 NOTE — DIETITIAN INITIAL EVALUATION ADULT. - ORAL INTAKE PTA/DIET HISTORY
Per son, pt with decreased appetite over last year, possible wt loss early on but no known wt loss in last 6 months. DM well controlled per son, eats healthy diet. Recently started on protein shakes but not taking often.

## 2021-08-14 NOTE — CONSULT NOTE ADULT - ASSESSMENT
61 year old male PMHx DM2, Hypothyroid, Anemia ( Fe Def).  Presents to ED with 3 days of worsening fatigue nausea and poor PO intake.  Seen at urgent care 2 days ago and Rx pepcid and reglan.  Today with multiple episodes of non-bloody diarrhea.       Hypovolumic Hyponatremia   AMS from above       Admit to ICU   s/p 1L NS in ED with raise of 3 points to SNa 119  Closely monitor Na raise keep < 7 points in 24 hrs   Yolis Hydration with LR at 50cc/hr - may require hydration with D5W if Na moving too fast   Will Recheck Na in 4 hrs then Q6hr   Check urine lytes / Osmoles / Serum Osmoles   + Rhino / Entero Virus   Covid Neg   Felix Cath for strict I/O monitoring   Agitated and pulling at lines and felix - No contraindication to soft wrist restrains to facilitate medical mgt.  DVT PPx   Lispro by SS  Diet as tolerates /PPI   Case D/W Jessa Joseph    61 year old male PMHx DM2, Hypothyroid, Anemia ( Fe Def).  Presents to ED with 3 days of worsening fatigue nausea and poor PO intake.  Seen at urgent care 2 days ago and Rx pepcid and reglan.  Today with multiple episodes of non-bloody diarrhea.       Hypovolumic Hyponatremia vs SIADH  AMS from above       Admit to ICU   s/p 1L NS in ED with raise of 3 points to SNa 119  Closely monitor Na raise keep < 7 points in 24 hrs   Yolis Hydration with LR at 50cc/hr - may require hydration with D5W if Na moving too fast   Will Recheck Na in 4 hrs then Q6hr   Check urine lytes / Osmoles / Serum Osmoles   + Rhino / Entero Virus   Covid Neg   Felix Cath for strict I/O monitoring   Agitated and pulling at lines and felix - No contraindication to soft wrist restrains to facilitate medical mgt.  DVT PPx   Lispro by SS  Diet as tolerates /PPI   Case D/W Jessa Joseph

## 2021-08-14 NOTE — PROGRESS NOTE ADULT - PROBLEM SELECTOR PLAN 3
pt with 6 episodes of loose, non-formed stool, decreased appetite on admission  - GI PCR, stool culture, C diff pcr ordered, f/u results  - Management as per ICU

## 2021-08-14 NOTE — ED ADULT NURSE REASSESSMENT NOTE - NS ED NURSE REASSESS COMMENT FT1
patient becoming more confused trying to climb OOB and leave room, wife with patient. Dr Dejesus called and ordered ativan which has helped so far, pt brought to CT scan. Awaiting bed at this time.

## 2021-08-14 NOTE — DIETITIAN INITIAL EVALUATION ADULT. - PROBLEM SELECTOR PLAN 4
Chronic, stable  - Takes metformin bid at home, hold all oral anti-diabetic agents  - ldss, accu-checks, hypoglycemia protocol  - check A1c  - Management per ICU

## 2021-08-14 NOTE — H&P ADULT - PROBLEM SELECTOR PLAN 2
pt with ams on admission, aaox1-2, per wife   - Check CT Head pt with ams on admission, aaox1-2, wife states this is not his baseline and insists he is confused 2/2 being anxious about going home and medications he was given in urgent care  - likely 2/2 hyponatremia  - Check CT Head  - Management per ICU pt with ams on admission, aaox1-2, wife states this is not his baseline and insists he is confused 2/2 being anxious about going home and medications he was given in urgent care  - likely 2/2 hyponatremia  - Check CT Head  - Recommend Aspiration and seizure precautions  - Management per ICU pt with ams on admission, aaox1-2, wife states this is not his baseline and insists he is confused 2/2 being anxious about going home and medications he was given in urgent care  - likely 2/2 hyponatremia  - Check CT Head  -  Aspiration and seizure precautions  - correct sodium. avoid rapid correction

## 2021-08-14 NOTE — PROGRESS NOTE ADULT - PROBLEM SELECTOR PLAN 5
Chronic, stable  - Takes metformin bid at home, hold all oral anti-diabetic agents  - ldss, accu-checks, hypoglycemia protocol  - F/u A1c  - Management as per ICU

## 2021-08-14 NOTE — H&P ADULT - PROBLEM SELECTOR PLAN 3
Chronic, per wife pt takes iron daily at home  - Hb 10.5 on admission, microcytic  - Continue home Iron 325mg qd  - monitor for s/sx of bleed Chronic, per wife pt takes iron daily at home  - Hb 10.5 on admission, microcytic  - Continue home Iron 325mg qd  - monitor for s/sx of bleed  - Management per ICU

## 2021-08-14 NOTE — DIETITIAN INITIAL EVALUATION ADULT. - OTHER INFO
Pt admit with hyponatremia, with decreased oral intake/diarrhea x6 days.  Son agreeable to pt trying glucerna supplement. BMI 22 with current wt.

## 2021-08-14 NOTE — PROGRESS NOTE ADULT - PROBLEM SELECTOR PLAN 2
pt with ams on admission, aaox1-2, wife states this is not his baseline and insists he is confused 2/2 being anxious   - likely 2/2 hyponatremia  - CT Head ordered, f/u results   - Aspiration and seizure precautions  - Monitor sodium   - s/p Ativan, now on Precedex   - Management as per ICU pt with ams on admission, aaox1-2, wife states this is not his baseline  - likely 2/2 hyponatremia  - CT Head ordered, f/u results   - Aspiration and seizure precautions  - Monitor sodium   - s/p Ativan, now on Precedex   - Management as per ICU

## 2021-08-14 NOTE — PROGRESS NOTE ADULT - ATTENDING COMMENTS
60 y/o M with PMHx of T2DM, Iron deficiency anemia, Hypothyroidism presenting nausea, poor PO intake, and fatigue for the past 3 days. Admitted to the ICU for metabolic encephalopathy likely 2/2 symptomatic hyponatremia in the setting of enterovirus infection, and diarrhea. Appreciated nephrology input. Salt tabs as ordered. Further plan per ICU     I personally conducted a physical examination of the patient. I personally gathered the patient's history. I edited the above listed findings which were prepared by the listed resident physician.

## 2021-08-14 NOTE — PROGRESS NOTE ADULT - ATTENDING COMMENTS
62 yo man with Hx DM2, hypothyroid presenting with nausea, anorexia, diarrhea and then progressive confusion, found to have severe hyponatremia and +rhino/enterovirus. Suspect hypovolemic v SIADH.  Correcting at appropriate rate.    --encephalopathy likely due to hyponatremia  check CTH  --stable from hemodynamic and respiratory standpoint  --hyponatremia correcting at appropriate rate  goal correction 8-10 in 24h  d/c LR and start NaCl tabs x 2 doses  check Na q4-6h  check TSH and cortisol  1L fluid restriction  maintain felix for strict I/O  --diet with mental status improves  --rhino/enterovirus infection, supportive care  --DM2, ISS  --plan discussed with son and daughter at bedside  --discussed with renal

## 2021-08-14 NOTE — H&P ADULT - NSHPREVIEWOFSYSTEMS_GEN_ALL_CORE
Constitutional: denies fever, chills, diaphoresis   HEENT: denies blurry vision, difficulty hearing  Respiratory: denies SOB, DURAN, cough, sputum production, wheezing  Cardiovascular: denies CP, palpitations, edema  Gastrointestinal: denies nausea, vomiting, diarrhea, constipation, abdominal pain, melena, hematochezia   Genitourinary: denies dysuria, frequency, urgency, hematuria   Skin/Breast: denies rash, itching  Musculoskeletal: denies myalgias, joint swelling, muscle weakness  Neurologic: denies headache, weakness, dizziness, paresthesias, numbness/tingling Constitutional: denies fever, chills, diaphoresis   HEENT: denies blurry vision, difficulty hearing  Respiratory: denies SOB, DURAN, cough, sputum production, wheezing  Cardiovascular: denies CP, palpitations, edema  Gastrointestinal: Admits to nausea and diarrhea; denies vomiting, constipation, abdominal pain, melena, hematochezia   Genitourinary: denies dysuria, frequency, urgency, hematuria   Skin/Breast: denies rash, itching  Musculoskeletal: denies myalgias, joint swelling  Neurologic: Admits to fatigue/weakness; denies headache, dizziness, paresthesias, numbness/tingling

## 2021-08-14 NOTE — PROGRESS NOTE ADULT - SUBJECTIVE AND OBJECTIVE BOX
Patient is a 61y old  Male who presents with a chief complaint of Hyponatremia (14 Aug 2021 13:03)      INTERVAL HPI/OVERNIGHT EVENTS: Pt was seen and examined at bedside. Son at bedside. Fever 102.4F this am s/p tylenol. Pt is confused and asking for the restroom when seen. Unable to obtain history.       MEDICATIONS  (STANDING):  dexMEDEtomidine Infusion 0.2 MICROgram(s)/kG/Hr (3.45 mL/Hr) IV Continuous <Continuous>  dextrose 40% Gel 15 Gram(s) Oral once  dextrose 5%. 1000 milliLiter(s) (50 mL/Hr) IV Continuous <Continuous>  dextrose 5%. 1000 milliLiter(s) (100 mL/Hr) IV Continuous <Continuous>  dextrose 50% Injectable 25 Gram(s) IV Push once  dextrose 50% Injectable 12.5 Gram(s) IV Push once  dextrose 50% Injectable 25 Gram(s) IV Push once  enoxaparin Injectable 40 milliGRAM(s) SubCutaneous daily  glucagon  Injectable 1 milliGRAM(s) IntraMuscular once  insulin lispro (ADMELOG) corrective regimen sliding scale   SubCutaneous Before meals and at bedtime  lactated ringers. 1000 milliLiter(s) (50 mL/Hr) IV Continuous <Continuous>  levothyroxine 75 MICROGram(s) Oral daily    MEDICATIONS  (PRN):      Allergies    No Known Allergies    Intolerances        REVIEW OF SYSTEMS: Unable to obtain due to pt's current mental status     Vital Signs Last 24 Hrs  T(C): 37.6 (14 Aug 2021 13:22), Max: 39.1 (14 Aug 2021 08:10)  T(F): 99.6 (14 Aug 2021 13:22), Max: 102.4 (14 Aug 2021 08:10)  HR: 82 (14 Aug 2021 13:00) (73 - 95)  BP: 129/61 (14 Aug 2021 13:00) (107/59 - 157/73)  BP(mean): 88 (14 Aug 2021 13:00) (77 - 112)  RR: 22 (14 Aug 2021 13:00) (17 - 42)  SpO2: 100% (14 Aug 2021 13:00) (94% - 100%)    PHYSICAL EXAM:  GENERAL: NAD, elderly gentleman, confused   HEENT:  anicteric, moist mucous membranes  CHEST/LUNG:  CTA b/l, no rales, wheezes, or rhonchi  HEART:  RRR, S1, S2  ABDOMEN:  BS+, soft, nontender, nondistended  EXTREMITIES: no edema, cyanosis, or calf tenderness  NERVOUS SYSTEM: unable to assess due to pt's current mental status, AAOx0-1     LABS:                        11.2   7.00  )-----------( 234      ( 14 Aug 2021 06:18 )             30.2     CBC Full  -  ( 14 Aug 2021 06:18 )  WBC Count : 7.00 K/uL  Hemoglobin : 11.2 g/dL  Hematocrit : 30.2 %  Platelet Count - Automated : 234 K/uL  Mean Cell Volume : 77.8 fl  Mean Cell Hemoglobin : 28.9 pg  Mean Cell Hemoglobin Concentration : 37.1 gm/dL  Auto Neutrophil # : x  Auto Lymphocyte # : x  Auto Monocyte # : x  Auto Eosinophil # : x  Auto Basophil # : x  Auto Neutrophil % : x  Auto Lymphocyte % : x  Auto Monocyte % : x  Auto Eosinophil % : x  Auto Basophil % : x    14 Aug 2021 12:58    120    |  89     |  8      ----------------------------<  99     3.8     |  22     |  0.80     Ca    8.1        14 Aug 2021 12:58    TPro  7.0    /  Alb  3.3    /  TBili  0.8    /  DBili  x      /  AST  35     /  ALT  37     /  AlkPhos  86     14 Aug 2021 06:18      Urinalysis Basic - ( 13 Aug 2021 22:15 )    Color: Yellow / Appearance: Clear / S.015 / pH: x  Gluc: x / Ketone: Negative  / Bili: Negative / Urobili: Negative   Blood: x / Protein: Negative / Nitrite: Negative   Leuk Esterase: Negative / RBC: 0-2 /HPF / WBC 0-2   Sq Epi: x / Non Sq Epi: Occasional / Bacteria: Occasional      CAPILLARY BLOOD GLUCOSE      POCT Blood Glucose.: 103 mg/dL (14 Aug 2021 08:03)          RADIOLOGY & ADDITIONAL TESTS:     Personally reviewed.     Consultant(s) Notes Reviewed:  [x] YES  [ ] NO

## 2021-08-14 NOTE — H&P ADULT - ASSESSMENT
60 y/o M with PMHx of T2DM, Iron deficiency anemia, Hypothyroidism presenting with hyponatremia.  62 y/o M with PMHx of T2DM, Iron deficiency anemia, Hypothyroidism presenting nausea, poor PO intake, and fatigue for the past 3 days. Admit for Hyponatremia and metabolic encephalopathy.  62 y/o M with PMHx of T2DM, Iron deficiency anemia, Hypothyroidism presenting nausea, poor PO intake, and fatigue for the past 3 days. Admit to ICU for metabolic encephalopathy likely 2/2 hyponatremia.  60 y/o M with PMHx of T2DM, Iron deficiency anemia, Hypothyroidism presenting nausea, poor PO intake, and fatigue for the past 3 days. Admit to ICU for metabolic encephalopathy likely 2/2 symptomatic hyponatremia in setting of enterovirus infection

## 2021-08-14 NOTE — PROGRESS NOTE ADULT - SUBJECTIVE AND OBJECTIVE BOX
Patient is a 61y old  Male who presents with a chief complaint of Hyponatremia (14 Aug 2021 08:56)    24 hour events: 61 year old male PMHx DM2, Hypothyroid, iron deficiency anemia. Presents to ED with 3 days of worsening fatigue nausea and poor PO intake. Seen at urgent care 2 days ago and given  Rx pepcid and reglan. Multiple episodes of non-bloody diarrhea. As per son, pt has been having decreased PO intake x months. No recent abx, no sick contacts, no fever, SOB CP oe Sz.   Wife at bedside and above information per her.  Patient confused on exam and unable to participate in HPI or ROS.       REVIEW OF SYSTEMS  Constitutional: No fever, chills, fatigue  Neuro: No headache, numbness, weakness  Resp: No cough, wheezing, shortness of breath  CVS: No chest pain, palpitations, leg swelling  GI: No abdominal pain, nausea, vomiting, diarrhea   : No dysuria, frequency, incontinence  Skin: No itching, burning, rashes, or lesions   Msk: No joint pain or swelling  Psych: No depression, anxiety, mood swings  Heme: No bleeding    T(F): 100.8 (21 @ 10:24), Max: 102.4 (21 @ 08:10)  HR: 78 (21 @ 12:00) (73 - 95)  BP: 115/56 (21 @ 12:00) (107/59 - 157/73)  RR: 22 (21 @ 12:00) (17 - 42)  SpO2: 99% (21 @ 12:00) (94% - 99%)  Wt(kg): --            I&O's Summary     @ 07:  -   @ 07:00  --------------------------------------------------------  IN: 350 mL / OUT: 1575 mL / NET: -1225 mL     @ 07:01  -   @ 13:04  --------------------------------------------------------  IN: 100 mL / OUT: 75 mL / NET: 25 mL      PHYSICAL EXAM  General:   CNS:   HEENT:   Resp:   CVS:   Abd:   Ext:   Skin:     MEDICATIONS      dextrose 40% Gel Oral  dextrose 50% Injectable IV Push  dextrose 50% Injectable IV Push  dextrose 50% Injectable IV Push  glucagon  Injectable IntraMuscular  insulin lispro (ADMELOG) corrective regimen sliding scale SubCutaneous  levothyroxine Oral      dexMEDEtomidine Infusion IV Continuous      enoxaparin Injectable SubCutaneous        dextrose 5%. IV Continuous  dextrose 5%. IV Continuous  lactated ringers. IV Continuous                                11.2   7.00  )-----------( 234      ( 14 Aug 2021 06:18 )             30.2       08-14    119<LL>  |  89<L>  |  8   ----------------------------<  96  3.5   |  22  |  0.61    Ca    8.0<L>      14 Aug 2021 06:18    TPro  7.0  /  Alb  3.3  /  TBili  0.8  /  DBili  x   /  AST  35  /  ALT  37  /  AlkPhos  86  -    Lactate 0.5            @ 02:05            Urinalysis Basic - ( 13 Aug 2021 22:15 )    Color: Yellow / Appearance: Clear / S.015 / pH: x  Gluc: x / Ketone: Negative  / Bili: Negative / Urobili: Negative   Blood: x / Protein: Negative / Nitrite: Negative   Leuk Esterase: Negative / RBC: 0-2 /HPF / WBC 0-2   Sq Epi: x / Non Sq Epi: Occasional / Bacteria: Occasional          Rapid RVP Result: Detected ( @ 22:15)    Radiology: ***  Bedside lung ultrasound: ***  Bedside ECHO: ***    CENTRAL LINE: Y/N          DATE INSERTED:              REMOVE: Y/N  FAIRBANKS: Y/N                        DATE INSERTED:              REMOVE: Y/N  A-LINE: Y/N                       DATE INSERTED:              REMOVE: Y/N    GLOBAL ISSUE/BEST PRACTICE  Analgesia:   Sedation:   CAM-ICU:   HOB elevation: yes  Stress ulcer prophylaxis:   VTE prophylaxis:   Glycemic control:   Nutrition:     CODE STATUS: ***  [facility] discussion: Y       Patient is a 61y old  Male who presents with a chief complaint of Hyponatremia (14 Aug 2021 08:56)    24 hour events: 61 year old male PMHx DM2, Hypothyroid, iron deficiency anemia. Presents to ED with 3 days of worsening fatigue nausea and poor PO intake. Seen at urgent care 2 days ago and given  Rx pepcid and reglan. Multiple episodes of non-bloody diarrhea. As per son, pt has been having decreased PO intake x months. No sick contacts.  Patient confused on exam and unable to participate in HPI or ROS.       REVIEW OF SYSTEMS unable to obtain 2/2 metabolic encephalopathy hyponatremia       T(F): 100.8 (21 @ 10:24), Max: 102.4 (21 @ 08:10)  HR: 78 (21 @ 12:00) (73 - 95)  BP: 115/56 (21 @ 12:00) (107/59 - 157/73)  RR: 22 (21 @ 12:00) (17 - 42)  SpO2: 99% (21 @ 12:00) (94% - 99%)  Wt(kg): --            I&O's Summary     @ 07:  -   @ 07:00  --------------------------------------------------------  IN: 350 mL / OUT: 1575 mL / NET: -1225 mL     @ 07:  -   @ 13:04  --------------------------------------------------------  IN: 100 mL / OUT: 75 mL / NET: 25 mL    PHYSICAL EXAM  General:   CNS:   HEENT:   Resp:   CVS:   Abd:   Ext:   Skin:     MEDICATIONS      dextrose 40% Gel Oral  dextrose 50% Injectable IV Push  dextrose 50% Injectable IV Push  dextrose 50% Injectable IV Push  glucagon  Injectable IntraMuscular  insulin lispro (ADMELOG) corrective regimen sliding scale SubCutaneous  levothyroxine Oral      dexMEDEtomidine Infusion IV Continuous      enoxaparin Injectable SubCutaneous        dextrose 5%. IV Continuous  dextrose 5%. IV Continuous  lactated ringers. IV Continuous                                11.2   7.00  )-----------( 234      ( 14 Aug 2021 06:18 )             30.2       08-14    119<LL>  |  89<L>  |  8   ----------------------------<  96  3.5   |  22  |  0.61    Ca    8.0<L>      14 Aug 2021 06:18    TPro  7.0  /  Alb  3.3  /  TBili  0.8  /  DBili  x   /  AST  35  /  ALT  37  /  AlkPhos  86      Lactate 0.5            @ 02:05            Urinalysis Basic - ( 13 Aug 2021 22:15 )    Color: Yellow / Appearance: Clear / S.015 / pH: x  Gluc: x / Ketone: Negative  / Bili: Negative / Urobili: Negative   Blood: x / Protein: Negative / Nitrite: Negative   Leuk Esterase: Negative / RBC: 0-2 /HPF / WBC 0-2   Sq Epi: x / Non Sq Epi: Occasional / Bacteria: Occasional          Rapid RVP Result: Detected ( @ 22:15)    Radiology: ***  Bedside lung ultrasound: ***  Bedside ECHO: ***    CENTRAL LINE: Y/N          DATE INSERTED:              REMOVE: Y/N  FAIRBANKS: Y/N                        DATE INSERTED:              REMOVE: Y/N  A-LINE: Y/N                       DATE INSERTED:              REMOVE: Y/N    GLOBAL ISSUE/BEST PRACTICE  Analgesia:   Sedation:   CAM-ICU:   HOB elevation: yes  Stress ulcer prophylaxis:   VTE prophylaxis:   Glycemic control:   Nutrition:     CODE STATUS: ***  [facility] discussion: Y       Patient is a 61y old  Male who presents with a chief complaint of Hyponatremia (14 Aug 2021 08:56)    24 hour events: 61 year old male PMHx DM2, Hypothyroid, iron deficiency anemia. Presents to ED with 3 days of worsening fatigue nausea and poor PO intake. Seen at urgent care 2 days ago and given Rx pepcid and reglan, pt then developed loose stools x6 nonbloody. As per son, pt has been having decreased PO intake x 1 week. No sick contacts. As per daughter, pt went to a wedding recenly, may have been exposed to sick contact there. Patient confused on exam and unable to participate in HPI or ROS.     REVIEW OF SYSTEMS unable to obtain 2/2 metabolic encephalopathy hyponatremia       T(F): 100.8 (21 @ 10:24), Max: 102.4 (21 @ 08:10)  HR: 78 (21 @ 12:00) (73 - 95)  BP: 115/56 (21 @ 12:00) (107/59 - 157/73)  RR: 22 (21 @ 12:00) (17 - 42)  SpO2: 99% (21 @ 12:00) (94% - 99%)  Wt(kg): --      I&O's Summary     @ 07:  -   @ 07:00  --------------------------------------------------------  IN: 350 mL / OUT: 1575 mL / NET: -1225 mL     @ 07:  -   @ 13:04  --------------------------------------------------------  IN: 100 mL / OUT: 75 mL / NET: 25 mL    PHYSICAL EXAM    General: moving aorund in bed, confused   CNS: A & O x1, confused, nonfocal   HEENT: dry mucous membranes, pupils equal, reactive to light.  Symmetric. No JVD.   PULM: Clear to auscultation bilaterally, no significant sputum production  CVS: Regular rate and rhythm, no murmurs, rubs, or gallops  ABD: Soft, nondistended, nontender, normoactive bowel sounds, no masses  EXT: No edema, nontender.   SKIN: Warm and well perfused, soft restraints on b/l UEs     MEDICATIONS      dextrose 40% Gel Oral  dextrose 50% Injectable IV Push  dextrose 50% Injectable IV Push  dextrose 50% Injectable IV Push  glucagon  Injectable IntraMuscular  insulin lispro (ADMELOG) corrective regimen sliding scale SubCutaneous  levothyroxine Oral      dexMEDEtomidine Infusion IV Continuous      enoxaparin Injectable SubCutaneous        dextrose 5%. IV Continuous  dextrose 5%. IV Continuous  lactated ringers. IV Continuous                                11.2   7.00  )-----------( 234      ( 14 Aug 2021 06:18 )             30.2       -    119<LL>  |  89<L>  |  8   ----------------------------<  96  3.5   |  22  |  0.61    Ca    8.0<L>      14 Aug 2021 06:18    TPro  7.0  /  Alb  3.3  /  TBili  0.8  /  DBili  x   /  AST  35  /  ALT  37  /  AlkPhos  86      Lactate 0.5            @ 02:05            Urinalysis Basic - ( 13 Aug 2021 22:15 )    Color: Yellow / Appearance: Clear / S.015 / pH: x  Gluc: x / Ketone: Negative  / Bili: Negative / Urobili: Negative   Blood: x / Protein: Negative / Nitrite: Negative   Leuk Esterase: Negative / RBC: 0-2 /HPF / WBC 0-2   Sq Epi: x / Non Sq Epi: Occasional / Bacteria: Occasional          Rapid RVP Result: Detected ( @ 22:15)    Radiology: *  EXAM:  XR CHEST PA LAT 2V                            PROCEDURE DATE:  2021          INTERPRETATION:  Admission.    PA lateral.    Normal heart mediastinum. Calcified aortic arch. Question small vague nodular opacity left base. Correlate with chest CT. No acute infiltrate or pleural collection.    IMPRESSION: No acute infiltrates. Question small left basilar nodule. Correlate with chest CT    --- End of Report ---            J CARLOS COLBY MD; Attending Radiologist  This document has been electronically signed. Aug 14 2021 11:19AM      CENTRAL LINE: no   [place]os: yes  a line: No     GLOBAL ISSUE/BEST PRACTICE  Analgesia: n/a   Sedation: n/a   HOB elevation: yes  Stress ulcer prophylaxis:   VTE prophylaxis: lovenox   Glycemic control:   Nutrition: fluid restrict to 1000 cc/24 hrs     CODE STATUS: ***  [facility] discussion: Y       Patient is a 61y old  Male who presents with a chief complaint of Hyponatremia (14 Aug 2021 08:56)    24 hour events: 61 year old male PMHx DM2, Hypothyroid, iron deficiency anemia. Presents to ED with 3 days of worsening fatigue nausea and poor PO intake. Seen at urgent care 2 days ago and given Rx pepcid and reglan, pt then developed loose stools x6 nonbloody. As per son, pt has been having decreased PO intake x 1 week. No sick contacts. As per daughter, pt went to a wedding recently, may have been exposed to sick contact there. Patient confused on exam and unable to participate in HPI or ROS.     REVIEW OF SYSTEMS unable to obtain 2/2 metabolic encephalopathy hyponatremia       T(F): 100.8 (21 @ 10:24), Max: 102.4 (21 @ 08:10)  HR: 78 (21 @ 12:00) (73 - 95)  BP: 115/56 (21 @ 12:00) (107/59 - 157/73)  RR: 22 (21 @ 12:00) (17 - 42)  SpO2: 99% (21 @ 12:00) (94% - 99%)  Wt(kg): --      I&O's Summary     @ :  -   @ 07:00  --------------------------------------------------------  IN: 350 mL / OUT: 1575 mL / NET: -1225 mL     @ 07:  -   @ 13:04  --------------------------------------------------------  IN: 100 mL / OUT: 75 mL / NET: 25 mL    PHYSICAL EXAM    General: moving aorund in bed, confused   CNS: A & O x1, confused, nonfocal   HEENT: dry mucous membranes, pupils equal, reactive to light.  Symmetric. No JVD.   PULM: Clear to auscultation bilaterally, no significant sputum production  CVS: Regular rate and rhythm, no murmurs, rubs, or gallops  ABD: Soft, nondistended, nontender, normoactive bowel sounds, no masses  EXT: No edema, nontender.   SKIN: Warm and well perfused, soft restraints on b/l UEs   Tubes lines: left peripheral line 18 gauge +Burgos     MEDICATIONS      dextrose 40% Gel Oral  dextrose 50% Injectable IV Push  dextrose 50% Injectable IV Push  dextrose 50% Injectable IV Push  glucagon  Injectable IntraMuscular  insulin lispro (ADMELOG) corrective regimen sliding scale SubCutaneous  levothyroxine Oral      dexMEDEtomidine Infusion IV Continuous      enoxaparin Injectable SubCutaneous        dextrose 5%. IV Continuous  dextrose 5%. IV Continuous  lactated ringers. IV Continuous                                11.2   7.00  )-----------( 234      ( 14 Aug 2021 06:18 )             30.2       08-14    119<LL>  |  89<L>  |  8   ----------------------------<  96  3.5   |  22  |  0.61    Ca    8.0<L>      14 Aug 2021 06:18    TPro  7.0  /  Alb  3.3  /  TBili  0.8  /  DBili  x   /  AST  35  /  ALT  37  /  AlkPhos  86      Lactate 0.5            @ 02:05            Urinalysis Basic - ( 13 Aug 2021 22:15 )    Color: Yellow / Appearance: Clear / S.015 / pH: x  Gluc: x / Ketone: Negative  / Bili: Negative / Urobili: Negative   Blood: x / Protein: Negative / Nitrite: Negative   Leuk Esterase: Negative / RBC: 0-2 /HPF / WBC 0-2   Sq Epi: x / Non Sq Epi: Occasional / Bacteria: Occasional          Rapid RVP Result: Detected ( @ 22:15)    Radiology: *  EXAM:  XR CHEST PA LAT 2V                            PROCEDURE DATE:  2021          INTERPRETATION:  Admission.    PA lateral.    Normal heart mediastinum. Calcified aortic arch. Question small vague nodular opacity left base. Correlate with chest CT. No acute infiltrate or pleural collection.    IMPRESSION: No acute infiltrates. Question small left basilar nodule. Correlate with chest CT    --- End of Report ---            J CARLOS COLBY MD; Attending Radiologist  This document has been electronically signed. Aug 14 2021 11:19AM      CENTRAL LINE: no   [place]: yes  a line: No     GLOBAL ISSUE/BEST PRACTICE  Analgesia: n/a   Sedation: n/a   HOB elevation: yes  Stress ulcer prophylaxis:   VTE prophylaxis: lovenox   Glycemic control: 90s   Nutrition: fluid restrict to 1000 cc/24 hrs     CODE STATUS: FULL CODE   [facility] discussion: Y       Patient is a 61y old  Male who presents with a chief complaint of Hyponatremia (14 Aug 2021 08:56)    24 hour events: 61 year old male PMHx DM2, Hypothyroid, iron deficiency anemia. Presents to ED with 3 days of worsening fatigue nausea and poor PO intake. Seen at urgent care 2 days ago and given Rx pepcid and reglan, pt then developed loose stools x6 nonbloody. As per son, pt has been having decreased PO intake x 1 week. No sick contacts. As per daughter, pt went to a wedding recently, may have been exposed to sick contact there. Patient confused on exam and unable to participate in HPI or ROS.     REVIEW OF SYSTEMS unable to obtain 2/2 metabolic encephalopathy hyponatremia       T(F): 100.8 (21 @ 10:24), Max: 102.4 (21 @ 08:10)  HR: 78 (21 @ 12:00) (73 - 95)  BP: 115/56 (21 @ 12:00) (107/59 - 157/73)  RR: 22 (21 @ 12:00) (17 - 42)  SpO2: 99% (21 @ 12:00) (94% - 99%)  Wt(kg): --      I&O's Summary     @ :  -   @ 07:00  --------------------------------------------------------  IN: 350 mL / OUT: 1575 mL / NET: -1225 mL     @ 07:  -   @ 13:04  --------------------------------------------------------  IN: 100 mL / OUT: 75 mL / NET: 25 mL    PHYSICAL EXAM    General: moving aorund in bed, confused   CNS: A & O x1, confused, nonfocal, moves all extremities   HEENT: dry mucous membranes, pupils equal, reactive to light.  Symmetric. No JVD.   PULM: Clear to auscultation bilaterally, no significant sputum production  CVS: Regular rate and rhythm, no murmurs, rubs, or gallops  ABD: Soft, nondistended, nontender, normoactive bowel sounds, no masses  EXT: No edema, nontender.   SKIN: Warm and well perfused, soft restraints on b/l UEs   Tubes lines: left peripheral line 18 gauge +Burgos     MEDICATIONS      dextrose 40% Gel Oral  dextrose 50% Injectable IV Push  dextrose 50% Injectable IV Push  dextrose 50% Injectable IV Push  glucagon  Injectable IntraMuscular  insulin lispro (ADMELOG) corrective regimen sliding scale SubCutaneous  levothyroxine Oral      dexMEDEtomidine Infusion IV Continuous      enoxaparin Injectable SubCutaneous        dextrose 5%. IV Continuous  dextrose 5%. IV Continuous  lactated ringers. IV Continuous                                11.2   7.00  )-----------( 234      ( 14 Aug 2021 06:18 )             30.2       08-14    119<LL>  |  89<L>  |  8   ----------------------------<  96  3.5   |  22  |  0.61    Ca    8.0<L>      14 Aug 2021 06:18    TPro  7.0  /  Alb  3.3  /  TBili  0.8  /  DBili  x   /  AST  35  /  ALT  37  /  AlkPhos  86  -    Lactate 0.5            @ 02:05            Urinalysis Basic - ( 13 Aug 2021 22:15 )    Color: Yellow / Appearance: Clear / S.015 / pH: x  Gluc: x / Ketone: Negative  / Bili: Negative / Urobili: Negative   Blood: x / Protein: Negative / Nitrite: Negative   Leuk Esterase: Negative / RBC: 0-2 /HPF / WBC 0-2   Sq Epi: x / Non Sq Epi: Occasional / Bacteria: Occasional          Rapid RVP Result: Detected ( @ 22:15)    Radiology: *  EXAM:  XR CHEST PA LAT 2V                            PROCEDURE DATE:  2021          INTERPRETATION:  Admission.    PA lateral.    Normal heart mediastinum. Calcified aortic arch. Question small vague nodular opacity left base. Correlate with chest CT. No acute infiltrate or pleural collection.    IMPRESSION: No acute infiltrates. Question small left basilar nodule. Correlate with chest CT    --- End of Report ---            J CARLOS COLBY MD; Attending Radiologist  This document has been electronically signed. Aug 14 2021 11:19AM      CENTRAL LINE: no   [place]: yes  a line: No     GLOBAL ISSUE/BEST PRACTICE  Analgesia: n/a   Sedation: n/a   HOB elevation: yes  Stress ulcer prophylaxis:   VTE prophylaxis: lovenox   Glycemic control: 90s   Nutrition: fluid restrict to 1000 cc/24 hrs     CODE STATUS: FULL CODE

## 2021-08-14 NOTE — DIETITIAN INITIAL EVALUATION ADULT. - PROBLEM SELECTOR PLAN 1
pt with 6 episodes of loose, non-formed stool, decreased appetite  - Na 116 on admission, admit to ICU for symptomatic hyponatremia likely in setting of overt volume loss and poor PO intake  - Enterovirus positive--likely etiology of symptoms  -  GI PCR, stool culture, C diff pcr pending  - s/p 1L ns bolus x 1 on admission, check repeat BMP  - Na increase of 3 pts s/p 1 L NS. c/w gentle hydration. avoid rapid correction.   - check urine lytes/osmol  - On LR @50cc/hr

## 2021-08-14 NOTE — H&P ADULT - NSHPSOCIALHISTORY_GEN_ALL_CORE
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

## 2021-08-14 NOTE — H&P ADULT - PROBLEM SELECTOR PLAN 4
Chronic, stable  - Takes metformin bid at home, hold all oral anti-diabetic agents  - ldss, accu-checks, hypoglycemia protocol  - f/u HA1c Chronic, stable  - Takes metformin bid at home, hold all oral anti-diabetic agents  - ldss, accu-checks, hypoglycemia protocol  - check A1c  - Management per ICU

## 2021-08-14 NOTE — DIETITIAN INITIAL EVALUATION ADULT. - PROBLEM SELECTOR PLAN 2
pt with ams on admission, aaox1-2, wife states this is not his baseline and insists he is confused 2/2 being anxious about going home and medications he was given in urgent care  - likely 2/2 hyponatremia  - Check CT Head  -  Aspiration and seizure precautions  - correct sodium. avoid rapid correction

## 2021-08-14 NOTE — CONSULT NOTE ADULT - SUBJECTIVE AND OBJECTIVE BOX
Patient is a 61y old  Male who presents with a chief complaint of Hyponatremia (14 Aug 2021 08:29)    HPI:  60 y/o M with PMHx of T2DM, Iron deficiency anemia, Hypothyroidism presenting with hyponatremia. History as per wife, Mario Rene at bedside as pt is AAOx1-2(Unsure where is is and the year). Wife knows this is not the patient's baseline and believes he is confused/agitated because "he just wants to go home". Per wife, patient has been "feeling unwell" with nausea, poor PO intake, and fatigue for the past 3 days. Pt had visited an urgent care and was prescribed some pepcid and reglan, which provided mild relief. Today, he reportedly had 6 episodes of loose, brown colored non-formed stool. Denies any recent antibiotic use. No recent travel or sick contacts.     ED Course: s/p pepcid 20mg IVP x 1, Zofran 4mg IVP x 1, 1L ns bolus x 1  Vitals: T 98.4, HR 73, /67, O2 98 on RA  Labs: Hb 10.5, MCV 77.7, Na 116, CO2 21, , Ca 7.9, Alb 3.0, Lipase 65, Enterovirus positive  CXR(pre-tello read): Lungs appear clear   (14 Aug 2021 00:42)      PAST MEDICAL HISTORY:  Diabetes    Anemia    Hypothyroidism        PAST SURGICAL HISTORY:      FAMILY HISTORY:  No pertinent family history in first degree relatives        SOCIAL HISTORY:    Allergies    No Known Allergies    Intolerances      Home Medications:  ferrous sulfate 324 mg (65 mg elemental iron) oral delayed release tablet: 1 tab(s) orally once a day (14 Aug 2021 00:53)  levothyroxine 75 mcg (0.075 mg) oral tablet: 1 tab(s) orally once a day (14 Aug 2021 00:53)  metFORMIN 1000 mg oral tablet: 1 tab(s) orally 2 times a day (14 Aug 2021 00:53)  Vitamin B12 1000 mcg oral tablet: 1 tab(s) orally once a day (14 Aug 2021 00:53)    MEDICATIONS  (STANDING):  acetaminophen  Suppository .. 650 milliGRAM(s) Rectal once  dextrose 40% Gel 15 Gram(s) Oral once  dextrose 5%. 1000 milliLiter(s) (50 mL/Hr) IV Continuous <Continuous>  dextrose 5%. 1000 milliLiter(s) (100 mL/Hr) IV Continuous <Continuous>  dextrose 50% Injectable 25 Gram(s) IV Push once  dextrose 50% Injectable 12.5 Gram(s) IV Push once  dextrose 50% Injectable 25 Gram(s) IV Push once  glucagon  Injectable 1 milliGRAM(s) IntraMuscular once  insulin lispro (ADMELOG) corrective regimen sliding scale   SubCutaneous Before meals and at bedtime  lactated ringers. 1000 milliLiter(s) (50 mL/Hr) IV Continuous <Continuous>  levothyroxine 75 MICROGram(s) Oral daily    MEDICATIONS  (PRN):      REVIEW OF SYSTEMS:  General:   Respiratory: No cough, SOB  Cardiovascular: No CP or Palpitations	  Gastrointestinal: No nausea, Vomiting. No diarrhea  Genitourinary: No urinary complaints	  Musculoskeletal: No leg swelling, No new rash or lesions	  Neurological: 	  all other systems negative    T(F): 102.4 (21 @ 08:10), Max: 102.4 (21 @ 08:10)  HR: 93 (21 @ 08:00) (73 - 95)  BP: 150/71 (21 @ 08:00) (128/67 - 157/73)  RR: 27 (21 @ 08:00) (17 - 42)  SpO2: 96% (21 @ 08:00) (94% - 99%)  Wt(kg): --    PHYSICAL EXAM:  General: NAD  Respiratory: b/l air entry  Cardiovascular: S1 S2  Gastrointestinal: soft  Extremities: edema            119<LL>  |  89<L>  |  8   ----------------------------<  96  3.5   |  22  |  0.61    Ca    8.0<L>      14 Aug 2021 06:18    TPro  7.0  /  Alb  3.3  /  TBili  0.8  /  DBili  x   /  AST  35  /  ALT  37  /  AlkPhos  86                            11.2   7.00  )-----------( 234      ( 14 Aug 2021 06:18 )             30.2       Hemoglobin: 11.2 g/dL ( @ 06:18)  Hematocrit: 30.2 % ( @ 06:18)  Potassium, Serum: 3.5 mmol/L ( @ 06:18)  Blood Urea Nitrogen, Serum: 8 mg/dL ( @ 06:18)      Creatinine, Serum: 0.61 ( @ 06:18)  Creatinine, Serum: 0.51 ( @ 01:42)  Creatinine, Serum: 0.54 ( @ 22:15)      Urinalysis Basic - ( 13 Aug 2021 22:15 )    Color: Yellow / Appearance: Clear / S.015 / pH: x  Gluc: x / Ketone: Negative  / Bili: Negative / Urobili: Negative   Blood: x / Protein: Negative / Nitrite: Negative   Leuk Esterase: Negative / RBC: 0-2 /HPF / WBC 0-2   Sq Epi: x / Non Sq Epi: Occasional / Bacteria: Occasional      LIVER FUNCTIONS - ( 14 Aug 2021 06:18 )  Alb: 3.3 g/dL / Pro: 7.0 g/dL / ALK PHOS: 86 U/L / ALT: 37 U/L / AST: 35 U/L / GGT: x                       I&O's Detail    13 Aug 2021 07:01  -  14 Aug 2021 07:00  --------------------------------------------------------  IN:    Lactated Ringers: 350 mL  Total IN: 350 mL    OUT:    Indwelling Catheter - Urethral (mL): 1375 mL    Voided (mL): 200 mL  Total OUT: 1575 mL    Total NET: -1225 mL               Patient is a 61y old  Male who presents with a chief complaint of Hyponatremia (14 Aug 2021 08:29)    HPI:  62 y/o M with PMHx of T2DM, Iron deficiency anemia, Hypothyroidism presenting with hyponatremia. History as per wife, Mario Rene at bedside as pt is AAOx1-2(Unsure where is is and the year). Wife knows this is not the patient's baseline and believes he is confused/agitated because "he just wants to go home". Per wife, patient has been "feeling unwell" with nausea, poor PO intake, and fatigue for the past 3 days. Pt had visited an urgent care and was prescribed some pepcid and reglan, which provided mild relief. Today, he reportedly had 6 episodes of loose, brown colored non-formed stool. Denies any recent antibiotic use. No recent travel or sick contacts.     ED Course: s/p pepcid 20mg IVP x 1, Zofran 4mg IVP x 1, 1L ns bolus x 1  Vitals: T 98.4, HR 73, /67, O2 98 on RA  Labs: Hb 10.5, MCV 77.7, Na 116, CO2 21, , Ca 7.9, Alb 3.0, Lipase 65, Enterovirus positive  CXR(pre-tello read): Lungs appear clear   (14 Aug 2021 00:42)      Renal consult called for hyponatremia. History obtained from chart and patient's son at bedside.     PAST MEDICAL HISTORY:  Diabetes    Anemia    Hypothyroidism        PAST SURGICAL HISTORY:      FAMILY HISTORY:  No pertinent family history in first degree relatives        SOCIAL HISTORY:    Allergies    No Known Allergies    Intolerances      Home Medications:  ferrous sulfate 324 mg (65 mg elemental iron) oral delayed release tablet: 1 tab(s) orally once a day (14 Aug 2021 00:53)  levothyroxine 75 mcg (0.075 mg) oral tablet: 1 tab(s) orally once a day (14 Aug 2021 00:53)  metFORMIN 1000 mg oral tablet: 1 tab(s) orally 2 times a day (14 Aug 2021 00:53)  Vitamin B12 1000 mcg oral tablet: 1 tab(s) orally once a day (14 Aug 2021 00:53)    MEDICATIONS  (STANDING):  acetaminophen  Suppository .. 650 milliGRAM(s) Rectal once  dextrose 40% Gel 15 Gram(s) Oral once  dextrose 5%. 1000 milliLiter(s) (50 mL/Hr) IV Continuous <Continuous>  dextrose 5%. 1000 milliLiter(s) (100 mL/Hr) IV Continuous <Continuous>  dextrose 50% Injectable 25 Gram(s) IV Push once  dextrose 50% Injectable 12.5 Gram(s) IV Push once  dextrose 50% Injectable 25 Gram(s) IV Push once  glucagon  Injectable 1 milliGRAM(s) IntraMuscular once  insulin lispro (ADMELOG) corrective regimen sliding scale   SubCutaneous Before meals and at bedtime  lactated ringers. 1000 milliLiter(s) (50 mL/Hr) IV Continuous <Continuous>  levothyroxine 75 MICROGram(s) Oral daily    MEDICATIONS  (PRN):      REVIEW OF SYSTEMS:  General: confused  Respiratory: No cough, SOB  Cardiovascular: No CP or Palpitations	  Gastrointestinal: No nausea, Vomiting. No diarrhea  Genitourinary: No urinary complaints	  Musculoskeletal:  No new rash or lesions	  all other systems negative    T(F): 102.4 (21 @ 08:10), Max: 102.4 (21 @ 08:10)  HR: 93 (21 @ 08:00) (73 - 95)  BP: 150/71 (21 @ 08:00) (128/67 - 157/73)  RR: 27 (21 @ 08:00) (17 - 42)  SpO2: 96% (21 @ 08:00) (94% - 99%)  Wt(kg): --    PHYSICAL EXAM:  General: NAD  Respiratory: b/l air entry  Cardiovascular: S1 S2  Gastrointestinal: soft  Extremities: no edema            119<LL>  |  89<L>  |  8   ----------------------------<  96  3.5   |  22  |  0.61    Ca    8.0<L>      14 Aug 2021 06:18    TPro  7.0  /  Alb  3.3  /  TBili  0.8  /  DBili  x   /  AST  35  /  ALT  37  /  AlkPhos  86                            11.2   7.00  )-----------( 234      ( 14 Aug 2021 06:18 )             30.2       Hemoglobin: 11.2 g/dL ( @ 06:18)  Hematocrit: 30.2 % ( @ 06:18)  Potassium, Serum: 3.5 mmol/L ( @ 06:18)  Blood Urea Nitrogen, Serum: 8 mg/dL ( @ 06:18)      Creatinine, Serum: 0.61 ( @ 06:18)  Creatinine, Serum: 0.51 ( @ 01:42)  Creatinine, Serum: 0.54 ( @ 22:15)      Urinalysis Basic - ( 13 Aug 2021 22:15 )    Color: Yellow / Appearance: Clear / S.015 / pH: x  Gluc: x / Ketone: Negative  / Bili: Negative / Urobili: Negative   Blood: x / Protein: Negative / Nitrite: Negative   Leuk Esterase: Negative / RBC: 0-2 /HPF / WBC 0-2   Sq Epi: x / Non Sq Epi: Occasional / Bacteria: Occasional      LIVER FUNCTIONS - ( 14 Aug 2021 06:18 )  Alb: 3.3 g/dL / Pro: 7.0 g/dL / ALK PHOS: 86 U/L / ALT: 37 U/L / AST: 35 U/L / GGT: x               I&O's Detail    13 Aug 2021 07:01  -  14 Aug 2021 07:00  --------------------------------------------------------  IN:    Lactated Ringers: 350 mL  Total IN: 350 mL    OUT:    Indwelling Catheter - Urethral (mL): 1375 mL    Voided (mL): 200 mL  Total OUT: 1575 mL    Total NET: -1225 mL

## 2021-08-14 NOTE — PROGRESS NOTE ADULT - ASSESSMENT
61 year old male PMHx DM2, Hypothyroid, Anemia ( Fe Def).  Presents to ED with 3 days of worsening fatigue nausea and poor PO intake.  Seen at urgent care 2 days ago and Rx pepcid and reglan.  Today with multiple episodes of non-bloody diarrhea.       Hypovolumic Hyponatremia vs SIADH  AMS from above       Admit to ICU   s/p 1L NS in ED with raise of 3 points to SNa 119  Closely monitor Na raise keep < 7 points in 24 hrs   Yolis Hydration with LR at 50cc/hr - may require hydration with D5W if Na moving too fast   Will Recheck Na in 4 hrs then Q6hr   Check urine lytes / Osmoles / Serum Osmoles   + Rhino / Entero Virus   Covid Neg   Felix Cath for strict I/O monitoring   Agitated and pulling at lines and felix - No contraindication to soft wrist restrains to facilitate medical mgt.  DVT PPx   Lispro by SS  Diet as tolerates /PPI   Case D/W Jessa Joseph    61 year old male PMHx DM2, Hypothyroid, Anemia ( Fe Def). Presents to ED with 3 days of worsening fatigue nausea and poor PO intake.  Seen at urgent care 2 days ago and Rx pepcid and reglan.  Today with multiple episodes of non-bloody diarrhea.       Hypovolumic Hyponatremia vs SIADH  AMS from above       Admit to ICU   s/p 1L NS in ED with raise of 3 points to SNa 119  Closely monitor Na raise keep < 7 points in 24 hrs   Yolis Hydration with LR at 50cc/hr - may require hydration with D5W if Na moving too fast   Will Recheck Na in 4 hrs then Q6hr   Check urine lytes / Osmoles / Serum Osmoles   + Rhino / Entero Virus   Covid Neg   Felix Cath for strict I/O monitoring   Agitated and pulling at lines and eflix - No contraindication to soft wrist restrains to facilitate medical mgt.  DVT PPx   Lispro by SS  Diet as tolerates /PPI   Case D/W Jessa Joseph    61 year old male PMHx DM2, Hypothyroid, Anemia ( Fe Def). Admitted to ICU for hyponatremia suspect hypovolemic hyponatremia vs SIADH.     Hypovolemic Hyponatremia vs SIADH  encephalopathy suspect metabolic 2/2 hyponatremia       Plan:     Neuro:   -encephalopathy suspect metabolic 2/2 hyponatremia  -CT head r/o other causes of encephelopathy  -1:1 in place  -Agitated and pulling at lines and felix - continue soft wrist restrains     CV:   -BPs 130-160s  -no hx of HTN  -monitor     Resp: no active issues    GI  -diet consistent carb diet   -fluid restrict to 1000 cc/24hrs     Renal:   -Hypovolemic hyponatremia (multiple episode of vomiting, decreased PO intake x 1 week in setting of rhinovirus) vs < euvolemic hyponatremia SIADH induced by N/V   -uric acid 2.6 suspect SIADH   -Na 116--1L NS-> 119 LR @ 50 cc/hr ---> 120---> salt tablets, stop LR  -Closely monitor Na raise keep < 7 points in 24 hrs   -f/u BMP at 8 pm   -fluid restrict to 1000 cc/24 hrs  -f/u urine lytes, urine osmolality, serum osmolality  -felix monitor I & Os    ID:   -+ Rhino / Entero Virus  -isolation precautions   -monitor off bx     Heme:   - microcytic anemia, iron deficiency anemia   -Hgb stable ~11  -continue home iron pill     Endo:   -type 2 diabetes not on home insulin   -BGs 90s  -hold home metformin  -ISS   -TSH f/u     -Skin: lines/felix: felix    Dispo  - code status: FULL CODE

## 2021-08-14 NOTE — CONSULT NOTE ADULT - SUBJECTIVE AND OBJECTIVE BOX
Patient is a 61y old  Male who presents with a chief complaint of Hyponatremia (14 Aug 2021 00:42)      BRIEF HOSPITAL COURSE: 61 year old male PMHx DM2, Hypothyroid, Anemia ( Fe Def).  Presents to ED with 3 days of worsening fatigue nausea and poor PO intake.  Seen at urgent care 2 days ago and Rx pepcid and reglan.  Today with multiple episodes of non-bloody diarrhea.  No Recent ABX, No Sick contacts, no fever, SOB CP oe Sz.   Wife at bedside and above information per her.  Patient confused on exam and unable to participate in HPI or ROS.     Events last 24 hours: Found to be + Rhino/Entero Virus / Hyponatremia 116.    PAST MEDICAL & SURGICAL HISTORY:  Diabetes  Type 2  Anemia  Hypothyroidism        Review of Systems: Unable to access       Medications:  lactated ringers. 1000 milliLiter(s) IV Continuous <Continuous>        ICU Vital Signs Last 24 Hrs  T(C): 37.2 (14 Aug 2021 02:46), Max: 37.2 (14 Aug 2021 02:46)  T(F): 99 (14 Aug 2021 02:46), Max: 99 (14 Aug 2021 02:46)  HR: 92 (14 Aug 2021 03:00) (73 - 92)  BP: 131/73 (14 Aug 2021 03:00) (128/67 - 157/73)  BP(mean): 94 (14 Aug 2021 03:00) (94 - 94)  RR: 37 (14 Aug 2021 03:00) (17 - 42)  SpO2: 99% (14 Aug 2021 03:00) (98% - 99%)          I&O's Detail    13 Aug 2021 07:01  -  14 Aug 2021 03:42  --------------------------------------------------------  IN:    Lactated Ringers: 150 mL  Total IN: 150 mL    OUT:    Voided (mL): 200 mL  Total OUT: 200 mL    Total NET: -50 mL            LABS:                        10.5   6.49  )-----------( 213      ( 13 Aug 2021 22:15 )             28.6     08-14    119<LL>  |  89<L>  |  9   ----------------------------<  103<H>  3.7   |  22  |  0.51    Ca    7.9<L>      14 Aug 2021 01:42    TPro  6.8  /  Alb  3.0<L>  /  TBili  0.5  /  DBili  x   /  AST  28  /  ALT  36  /  AlkPhos  84            CAPILLARY BLOOD GLUCOSE          Urinalysis Basic - ( 13 Aug 2021 22:15 )    Color: Yellow / Appearance: Clear / S.015 / pH: x  Gluc: x / Ketone: Negative  / Bili: Negative / Urobili: Negative   Blood: x / Protein: Negative / Nitrite: Negative   Leuk Esterase: Negative / RBC: 0-2 /HPF / WBC 0-2   Sq Epi: x / Non Sq Epi: Occasional / Bacteria: Occasional      CULTURES:  Rapid RVP Result: Detected ( @ 22:15)          Physical Examination:    General:  Awake.  Confused, interactive, nonfocal    HEENT: Pupils equal, reactive to light.  Symmetric. No JVD.    PULM: Clear to auscultation bilaterally, no significant sputum production    CVS: Regular rate and rhythm, no murmurs, rubs, or gallops    ABD: Soft, nondistended, nontender, normoactive bowel sounds, no masses    EXT: No edema, nontender    SKIN: Warm and well perfused, no rashes noted.          CRITICAL CARE TIME SPENT: 40 minutes   (Assessing presenting problems of acute illness, which pose high probability of life threatening deterioration or end organ damage/dysfunction, as well as medical decision making including initiating plan of care, reviewing data, reviewing radiologic exams, discussing with multidisciplinary team,  discussing goals of care with patient/family, and writing this note.  Non-inclusive of procedures performed)

## 2021-08-14 NOTE — PROGRESS NOTE ADULT - ASSESSMENT
60 y/o M with PMHx of T2DM, Iron deficiency anemia, Hypothyroidism presenting nausea, poor PO intake, and fatigue for the past 3 days. Admitted to the ICU for metabolic encephalopathy likely 2/2 symptomatic hyponatremia in the setting of enterovirus infection, and diarrhea.

## 2021-08-14 NOTE — PROGRESS NOTE ADULT - PROBLEM SELECTOR PLAN 4
Chronic, per wife pt takes iron daily at home  - Hb 10.5 on admission, microcytic  - Continue home Iron 325mg qd  - monitor for s/sx of bleed and transfuse prn   - Management as per ICU

## 2021-08-14 NOTE — H&P ADULT - PROBLEM SELECTOR PLAN 5
Chronic  - On synthroid 75mcg qd  - Check TSH Chronic  - On synthroid 75mcg qd  - Check TSH  - Management per ICU

## 2021-08-15 DIAGNOSIS — D35.2 BENIGN NEOPLASM OF PITUITARY GLAND: ICD-10-CM

## 2021-08-15 LAB
A1C WITH ESTIMATED AVERAGE GLUCOSE RESULT: 6.9 % — HIGH (ref 4–5.6)
ACTH SER-ACNC: 8 PG/ML — SIGNIFICANT CHANGE UP (ref 7.2–63.3)
ALBUMIN SERPL ELPH-MCNC: 3.4 G/DL — SIGNIFICANT CHANGE UP (ref 3.3–5)
ALP SERPL-CCNC: 88 U/L — SIGNIFICANT CHANGE UP (ref 40–120)
ALT FLD-CCNC: 64 U/L — SIGNIFICANT CHANGE UP (ref 12–78)
ANION GAP SERPL CALC-SCNC: 12 MMOL/L — SIGNIFICANT CHANGE UP (ref 5–17)
ANION GAP SERPL CALC-SCNC: 9 MMOL/L — SIGNIFICANT CHANGE UP (ref 5–17)
AST SERPL-CCNC: 160 U/L — HIGH (ref 15–37)
BASOPHILS # BLD AUTO: 0.03 K/UL — SIGNIFICANT CHANGE UP (ref 0–0.2)
BASOPHILS NFR BLD AUTO: 0.4 % — SIGNIFICANT CHANGE UP (ref 0–2)
BILIRUB SERPL-MCNC: 0.9 MG/DL — SIGNIFICANT CHANGE UP (ref 0.2–1.2)
BUN SERPL-MCNC: 10 MG/DL — SIGNIFICANT CHANGE UP (ref 7–23)
BUN SERPL-MCNC: 9 MG/DL — SIGNIFICANT CHANGE UP (ref 7–23)
CALCIUM SERPL-MCNC: 8.1 MG/DL — LOW (ref 8.5–10.1)
CALCIUM SERPL-MCNC: 8.1 MG/DL — LOW (ref 8.5–10.1)
CHLORIDE SERPL-SCNC: 90 MMOL/L — LOW (ref 96–108)
CHLORIDE SERPL-SCNC: 91 MMOL/L — LOW (ref 96–108)
CO2 SERPL-SCNC: 20 MMOL/L — LOW (ref 22–31)
CO2 SERPL-SCNC: 23 MMOL/L — SIGNIFICANT CHANGE UP (ref 22–31)
COVID-19 SPIKE DOMAIN AB INTERP: POSITIVE
COVID-19 SPIKE DOMAIN ANTIBODY RESULT: >250 U/ML — HIGH
CREAT ?TM UR-MCNC: 139 MG/DL — SIGNIFICANT CHANGE UP
CREAT SERPL-MCNC: 0.65 MG/DL — SIGNIFICANT CHANGE UP (ref 0.5–1.3)
CREAT SERPL-MCNC: 0.76 MG/DL — SIGNIFICANT CHANGE UP (ref 0.5–1.3)
EOSINOPHIL # BLD AUTO: 0.08 K/UL — SIGNIFICANT CHANGE UP (ref 0–0.5)
EOSINOPHIL NFR BLD AUTO: 1.1 % — SIGNIFICANT CHANGE UP (ref 0–6)
ESTIMATED AVERAGE GLUCOSE: 151 MG/DL — HIGH (ref 68–114)
GLUCOSE SERPL-MCNC: 110 MG/DL — HIGH (ref 70–99)
GLUCOSE SERPL-MCNC: 74 MG/DL — SIGNIFICANT CHANGE UP (ref 70–99)
HCT VFR BLD CALC: 33.5 % — LOW (ref 39–50)
HGB BLD-MCNC: 12.2 G/DL — LOW (ref 13–17)
IMM GRANULOCYTES NFR BLD AUTO: 0.3 % — SIGNIFICANT CHANGE UP (ref 0–1.5)
LYMPHOCYTES # BLD AUTO: 2.66 K/UL — SIGNIFICANT CHANGE UP (ref 1–3.3)
LYMPHOCYTES # BLD AUTO: 35.8 % — SIGNIFICANT CHANGE UP (ref 13–44)
MAGNESIUM SERPL-MCNC: 2 MG/DL — SIGNIFICANT CHANGE UP (ref 1.6–2.6)
MCHC RBC-ENTMCNC: 29 PG — SIGNIFICANT CHANGE UP (ref 27–34)
MCHC RBC-ENTMCNC: 36.4 GM/DL — HIGH (ref 32–36)
MCV RBC AUTO: 79.6 FL — LOW (ref 80–100)
MONOCYTES # BLD AUTO: 0.64 K/UL — SIGNIFICANT CHANGE UP (ref 0–0.9)
MONOCYTES NFR BLD AUTO: 8.6 % — SIGNIFICANT CHANGE UP (ref 2–14)
NEUTROPHILS # BLD AUTO: 3.99 K/UL — SIGNIFICANT CHANGE UP (ref 1.8–7.4)
NEUTROPHILS NFR BLD AUTO: 53.8 % — SIGNIFICANT CHANGE UP (ref 43–77)
NRBC # BLD: 0 /100 WBCS — SIGNIFICANT CHANGE UP (ref 0–0)
PHOSPHATE SERPL-MCNC: 3.9 MG/DL — SIGNIFICANT CHANGE UP (ref 2.5–4.5)
PLATELET # BLD AUTO: 249 K/UL — SIGNIFICANT CHANGE UP (ref 150–400)
POTASSIUM SERPL-MCNC: 3.8 MMOL/L — SIGNIFICANT CHANGE UP (ref 3.5–5.3)
POTASSIUM SERPL-MCNC: 4.2 MMOL/L — SIGNIFICANT CHANGE UP (ref 3.5–5.3)
POTASSIUM SERPL-SCNC: 3.8 MMOL/L — SIGNIFICANT CHANGE UP (ref 3.5–5.3)
POTASSIUM SERPL-SCNC: 4.2 MMOL/L — SIGNIFICANT CHANGE UP (ref 3.5–5.3)
PROLACTIN SERPL-MCNC: 28.6 NG/ML — HIGH (ref 4.1–18.4)
PROT SERPL-MCNC: 7.3 G/DL — SIGNIFICANT CHANGE UP (ref 6–8.3)
RBC # BLD: 4.21 M/UL — SIGNIFICANT CHANGE UP (ref 4.2–5.8)
RBC # FLD: 12.8 % — SIGNIFICANT CHANGE UP (ref 10.3–14.5)
SARS-COV-2 IGG+IGM SERPL QL IA: >250 U/ML — HIGH
SARS-COV-2 IGG+IGM SERPL QL IA: POSITIVE
SODIUM SERPL-SCNC: 121 MMOL/L — LOW (ref 135–145)
SODIUM SERPL-SCNC: 122 MMOL/L — LOW (ref 135–145)
SODIUM SERPL-SCNC: 123 MMOL/L — LOW (ref 135–145)
SODIUM UR-SCNC: 62 MMOL/L — SIGNIFICANT CHANGE UP
T3 SERPL-MCNC: 64 NG/DL — LOW (ref 80–200)
T4 AB SER-ACNC: 4 UG/DL — LOW (ref 4.6–12)
TSH SERPL-MCNC: 1.23 UIU/ML — SIGNIFICANT CHANGE UP (ref 0.36–3.74)
WBC # BLD: 7.42 K/UL — SIGNIFICANT CHANGE UP (ref 3.8–10.5)
WBC # FLD AUTO: 7.42 K/UL — SIGNIFICANT CHANGE UP (ref 3.8–10.5)

## 2021-08-15 PROCEDURE — 99291 CRITICAL CARE FIRST HOUR: CPT

## 2021-08-15 PROCEDURE — 99233 SBSQ HOSP IP/OBS HIGH 50: CPT

## 2021-08-15 RX ORDER — ACETAMINOPHEN 500 MG
1000 TABLET ORAL ONCE
Refills: 0 | Status: COMPLETED | OUTPATIENT
Start: 2021-08-15 | End: 2021-08-15

## 2021-08-15 RX ORDER — LEVOTHYROXINE SODIUM 125 MCG
50 TABLET ORAL
Refills: 0 | Status: DISCONTINUED | OUTPATIENT
Start: 2021-08-15 | End: 2021-08-17

## 2021-08-15 RX ORDER — ACYCLOVIR 50 MG/G
1 OINTMENT TOPICAL THREE TIMES A DAY
Refills: 0 | Status: DISCONTINUED | OUTPATIENT
Start: 2021-08-15 | End: 2021-08-18

## 2021-08-15 RX ORDER — SODIUM CHLORIDE 9 MG/ML
2 INJECTION INTRAMUSCULAR; INTRAVENOUS; SUBCUTANEOUS EVERY 6 HOURS
Refills: 0 | Status: COMPLETED | OUTPATIENT
Start: 2021-08-15 | End: 2021-08-15

## 2021-08-15 RX ORDER — LANOLIN ALCOHOL/MO/W.PET/CERES
5 CREAM (GRAM) TOPICAL AT BEDTIME
Refills: 0 | Status: DISCONTINUED | OUTPATIENT
Start: 2021-08-15 | End: 2021-08-18

## 2021-08-15 RX ORDER — ACETAMINOPHEN 500 MG
650 TABLET ORAL EVERY 6 HOURS
Refills: 0 | Status: DISCONTINUED | OUTPATIENT
Start: 2021-08-15 | End: 2021-08-15

## 2021-08-15 RX ORDER — ALBUTEROL 90 UG/1
2 AEROSOL, METERED ORAL EVERY 6 HOURS
Refills: 0 | Status: DISCONTINUED | OUTPATIENT
Start: 2021-08-15 | End: 2021-08-18

## 2021-08-15 RX ORDER — ACETAMINOPHEN 500 MG
650 TABLET ORAL EVERY 6 HOURS
Refills: 0 | Status: DISCONTINUED | OUTPATIENT
Start: 2021-08-15 | End: 2021-08-18

## 2021-08-15 RX ADMIN — ENOXAPARIN SODIUM 40 MILLIGRAM(S): 100 INJECTION SUBCUTANEOUS at 11:34

## 2021-08-15 RX ADMIN — Medication 650 MILLIGRAM(S): at 13:35

## 2021-08-15 RX ADMIN — Medication 50 MICROGRAM(S): at 12:31

## 2021-08-15 RX ADMIN — Medication 100 MILLIEQUIVALENT(S): at 00:07

## 2021-08-15 RX ADMIN — Medication 5 MILLIGRAM(S): at 21:37

## 2021-08-15 RX ADMIN — Medication 400 MILLIGRAM(S): at 06:43

## 2021-08-15 RX ADMIN — Medication 1000 MILLIGRAM(S): at 07:53

## 2021-08-15 RX ADMIN — Medication 650 MILLIGRAM(S): at 12:32

## 2021-08-15 RX ADMIN — Medication 400 MILLIGRAM(S): at 23:51

## 2021-08-15 RX ADMIN — SODIUM CHLORIDE 2 GRAM(S): 9 INJECTION INTRAMUSCULAR; INTRAVENOUS; SUBCUTANEOUS at 15:12

## 2021-08-15 RX ADMIN — ACYCLOVIR 1 APPLICATION(S): 50 OINTMENT TOPICAL at 23:27

## 2021-08-15 RX ADMIN — Medication 100 MILLIEQUIVALENT(S): at 01:53

## 2021-08-15 RX ADMIN — SODIUM CHLORIDE 2 GRAM(S): 9 INJECTION INTRAMUSCULAR; INTRAVENOUS; SUBCUTANEOUS at 21:05

## 2021-08-15 NOTE — PROGRESS NOTE ADULT - SUBJECTIVE AND OBJECTIVE BOX
Patient is a 61y old  Male who presents with a chief complaint of Hyponatremia (15 Aug 2021 12:54)  Patient seen in follow up for hyponatremia.        PAST MEDICAL HISTORY:  Diabetes    Anemia    Hypothyroidism        MEDICATIONS  (STANDING):  dextrose 40% Gel 15 Gram(s) Oral once  dextrose 5%. 1000 milliLiter(s) (50 mL/Hr) IV Continuous <Continuous>  dextrose 5%. 1000 milliLiter(s) (100 mL/Hr) IV Continuous <Continuous>  dextrose 50% Injectable 25 Gram(s) IV Push once  dextrose 50% Injectable 12.5 Gram(s) IV Push once  dextrose 50% Injectable 25 Gram(s) IV Push once  enoxaparin Injectable 40 milliGRAM(s) SubCutaneous daily  glucagon  Injectable 1 milliGRAM(s) IntraMuscular once  insulin lispro (ADMELOG) corrective regimen sliding scale   SubCutaneous Before meals and at bedtime  levothyroxine Injectable 50 MICROGram(s) IV Push <User Schedule>    MEDICATIONS  (PRN):  acetaminophen   Tablet .. 650 milliGRAM(s) Oral every 6 hours PRN Mild Pain (1 - 3)  ALBUTerol    90 MICROgram(s) HFA Inhaler 2 Puff(s) Inhalation every 6 hours PRN Shortness of Breath and/or WheezingVital Signs Last 24 Hrs  T(C): 37.8 (15 Aug 2021 12:00), Max: 39.1 (14 Aug 2021 19:00)  T(F): 100 (15 Aug 2021 12:00), Max: 102.4 (14 Aug 2021 19:00)  HR: 79 (15 Aug 2021 12:00) (78 - 90)  BP: 119/57 (15 Aug 2021 12:00) (86/56 - 161/65)  BP(mean): 82 (15 Aug 2021 12:00) (66 - 98)  RR: 22 (15 Aug 2021 12:00) (17 - 39)  SpO2: 99% (15 Aug 2021 12:00) (96% - 100%)          PHYSICAL EXAM:  General: No distress  Respiratory: b/l air entry  Cardiovascular: S1 S2  Gastrointestinal: soft  Extremities:  no edema                        LABS:                        12.2   7.42  )-----------( 249      ( 15 Aug 2021 05:40 )             33.5       121<L>  |  90   |  10   ----------------------------<  74   4.2    |  20   |  0.76     Ca    8.1<L>      15 Aug 2021 05:40  Phos  3.9     08-15  Mg     2.0     08-15    TPro  7.3  /  Alb  3.4  /  TBili  0.9  /  DBili  x   /  AST  160<H>  /  ALT  64  /  AlkPhos  88  08-15

## 2021-08-15 NOTE — PROGRESS NOTE ADULT - SUBJECTIVE AND OBJECTIVE BOX
Patient seen and examined at bedside  Nursing notes and ICU notes noted  patient today remains confused.   Wife at bedside  patient reporting bilateral leg cramping  VItals: patient  febrile: 102.4  sodium 122    Review of Systems: Limited review of systems due to encephalopathy  General:denies fever chills, weakness  HEENT: denies blurry vision,diffculty swallowing,  Cardiovascular: denies chest pain  Pulmonary:denies shortness of breath,   Gastrointestinal: denies abdominal pain, constipation, diarrhea,nausea   : denies hematuria, dysuria, or incontinence  Neurological: denies weakness, numbness , tingling, dizziness, tremors  MSK:  ++billateral lower extremity  muscle pain,  skin: denies skin rash, itching, burning, or  skin lesions  Psychiatrical: denies mood disturbances, anxierty, feeling depressed, depression , or difficulty sleeping    Objective:  Vitals  T(C): 37.8 (08-15-21 @ 12:00), Max: 39.1 (08-14-21 @ 19:00)  HR: 79 (08-15-21 @ 12:00) (78 - 90)  BP: 119/57 (08-15-21 @ 12:00) (86/56 - 161/65)  RR: 22 (08-15-21 @ 12:00) (17 - 39)  SpO2: 99% (08-15-21 @ 12:00) (96% - 100%)    Physical Exam:  General: confused,, no acute distress, well nourished  HEENT: Atraumatic, no LAD, trachea midline, PERRLA  Cardiovascular: normal s1s2, no murmurs, gallops or fricition rubs  Pulmonary: clear to ausculation Bilaterally, no wheezing , rhonchi  Gastrointestinal: soft non tender non distended, no masses felt, no organomegally  Muscloskeletal: no lower extremity edema, intact bilateral lower extremity pulses  Neurological: CN II-12 intact. No focal weakness  Psychiatrical: cooperative, anxious  SKIN: no rash, lesions or ulcers    Labs:                          12.2   7.42  )-----------( 249      ( 15 Aug 2021 05:40 )             33.5     08-15    121<L>  |  x   |  x   ----------------------------<  x   x    |  x   |  x     Ca    8.1<L>      15 Aug 2021 05:40  Phos  3.9     08-15  Mg     2.0     08-15    TPro  7.3  /  Alb  3.4  /  TBili  0.9  /  DBili  x   /  AST  160<H>  /  ALT  64  /  AlkPhos  88  08-15    LIVER FUNCTIONS - ( 15 Aug 2021 05:40 )  Alb: 3.4 g/dL / Pro: 7.3 g/dL / ALK PHOS: 88 U/L / ALT: 64 U/L / AST: 160 U/L / GGT: x                 Active Medications  MEDICATIONS  (STANDING):  dextrose 40% Gel 15 Gram(s) Oral once  dextrose 5%. 1000 milliLiter(s) (50 mL/Hr) IV Continuous <Continuous>  dextrose 5%. 1000 milliLiter(s) (100 mL/Hr) IV Continuous <Continuous>  dextrose 50% Injectable 25 Gram(s) IV Push once  dextrose 50% Injectable 12.5 Gram(s) IV Push once  dextrose 50% Injectable 25 Gram(s) IV Push once  enoxaparin Injectable 40 milliGRAM(s) SubCutaneous daily  glucagon  Injectable 1 milliGRAM(s) IntraMuscular once  insulin lispro (ADMELOG) corrective regimen sliding scale   SubCutaneous Before meals and at bedtime  levothyroxine Injectable 50 MICROGram(s) IV Push <User Schedule>    MEDICATIONS  (PRN):  acetaminophen   Tablet .. 650 milliGRAM(s) Oral every 6 hours PRN Mild Pain (1 - 3)  ALBUTerol    90 MICROgram(s) HFA Inhaler 2 Puff(s) Inhalation every 6 hours PRN Shortness of Breath and/or Wheezing     Patient is a 61y old  Male who presents with a chief complaint of Hyponatremia (15 Aug 2021 12:54)      INTERVAL HPI/OVERNIGHT EVENTS: Pt was seen and examined at bedside. Wife at bedside. Pt was febrile this morning, and is still confused reporting BL leg cramping. Unable to obtain history.     MEDICATIONS  (STANDING):  dextrose 40% Gel 15 Gram(s) Oral once  dextrose 5%. 1000 milliLiter(s) (50 mL/Hr) IV Continuous <Continuous>  dextrose 5%. 1000 milliLiter(s) (100 mL/Hr) IV Continuous <Continuous>  dextrose 50% Injectable 25 Gram(s) IV Push once  dextrose 50% Injectable 12.5 Gram(s) IV Push once  dextrose 50% Injectable 25 Gram(s) IV Push once  enoxaparin Injectable 40 milliGRAM(s) SubCutaneous daily  glucagon  Injectable 1 milliGRAM(s) IntraMuscular once  insulin lispro (ADMELOG) corrective regimen sliding scale   SubCutaneous Before meals and at bedtime  levothyroxine Injectable 50 MICROGram(s) IV Push <User Schedule>    MEDICATIONS  (PRN):  acetaminophen   Tablet .. 650 milliGRAM(s) Oral every 6 hours PRN Mild Pain (1 - 3)  ALBUTerol    90 MICROgram(s) HFA Inhaler 2 Puff(s) Inhalation every 6 hours PRN Shortness of Breath and/or Wheezing      Allergies    No Known Allergies    Intolerances          REVIEW OF SYSTEMS: Unable to obtain due to pt's current mental status     Vital Signs Last 24 Hrs  T(C): 37.8 (15 Aug 2021 12:00), Max: 39.1 (14 Aug 2021 19:00)  T(F): 100 (15 Aug 2021 12:00), Max: 102.4 (14 Aug 2021 19:00)  HR: 79 (15 Aug 2021 12:00) (78 - 90)  BP: 119/57 (15 Aug 2021 12:00) (86/56 - 161/65)  BP(mean): 82 (15 Aug 2021 12:00) (66 - 98)  RR: 22 (15 Aug 2021 12:00) (17 - 39)  SpO2: 99% (15 Aug 2021 12:00) (96% - 100%)    PHYSICAL EXAM:  General: confused, no acute distress, well nourished  HEENT: Atraumatic, no LAD, trachea midline, PERRLA  Cardiovascular: normal s1s2, no murmurs, gallops or friction rubs  Pulmonary: clear to ausculation Bilaterally, no wheezing , rhonchi  Gastrointestinal: soft non tender non distended, no masses felt, no organomegaly  Muscloskeletal: no lower extremity edema, intact bilateral lower extremity pulses  Neurological: No focal weakness  Psychiatrical: cooperative, anxious  SKIN: no rash, lesions or ulcers      LABS:                        12.2   7.42  )-----------( 249      ( 15 Aug 2021 05:40 )             33.5     CBC Full  -  ( 15 Aug 2021 05:40 )  WBC Count : 7.42 K/uL  Hemoglobin : 12.2 g/dL  Hematocrit : 33.5 %  Platelet Count - Automated : 249 K/uL  Mean Cell Volume : 79.6 fl  Mean Cell Hemoglobin : 29.0 pg  Mean Cell Hemoglobin Concentration : 36.4 gm/dL  Auto Neutrophil # : 3.99 K/uL  Auto Lymphocyte # : 2.66 K/uL  Auto Monocyte # : 0.64 K/uL  Auto Eosinophil # : 0.08 K/uL  Auto Basophil # : 0.03 K/uL  Auto Neutrophil % : 53.8 %  Auto Lymphocyte % : 35.8 %  Auto Monocyte % : 8.6 %  Auto Eosinophil % : 1.1 %  Auto Basophil % : 0.4 %    15 Aug 2021 11:40    121    |  x      |  x      ----------------------------<  x      x       |  x      |  x        Ca    8.1        15 Aug 2021 05:40  Phos  3.9       15 Aug 2021 05:40  Mg     2.0       15 Aug 2021 05:40    TPro  7.3    /  Alb  3.4    /  TBili  0.9    /  DBili  x      /  AST  160    /  ALT  64     /  AlkPhos  88     15 Aug 2021 05:40      Urinalysis Basic - ( 13 Aug 2021 22:15 )    Color: Yellow / Appearance: Clear / S.015 / pH: x  Gluc: x / Ketone: Negative  / Bili: Negative / Urobili: Negative   Blood: x / Protein: Negative / Nitrite: Negative   Leuk Esterase: Negative / RBC: 0-2 /HPF / WBC 0-2   Sq Epi: x / Non Sq Epi: Occasional / Bacteria: Occasional      CAPILLARY BLOOD GLUCOSE      POCT Blood Glucose.: 136 mg/dL (15 Aug 2021 11:32)  POCT Blood Glucose.: 73 mg/dL (15 Aug 2021 08:19)  POCT Blood Glucose.: 85 mg/dL (14 Aug 2021 21:36)  POCT Blood Glucose.: 96 mg/dL (14 Aug 2021 17:53)          RADIOLOGY & ADDITIONAL TESTS:   < from: CT Head No Cont (21 @ 16:05) >  IMPRESSION:    1)  large pituitary macroadenoma, which may be further assessed and delineated with MR imaging. See above.  2)  no acute cerebral infarct, hemorrhage, or collections identified.    The study is degraded by motion.        Personally reviewed.     Consultant(s) Notes Reviewed:  [x] YES  [ ] NO     Patient is a 61y old  Male who presents with a chief complaint of Hyponatremia (15 Aug 2021 12:54)      INTERVAL HPI/OVERNIGHT EVENTS: Pt was seen and examined at bedside. Wife at bedside. Pt was febrile this morning, and is still confused reporting BL leg cramping. Unable to obtain history.     MEDICATIONS  (STANDING):  dextrose 40% Gel 15 Gram(s) Oral once  dextrose 5%. 1000 milliLiter(s) (50 mL/Hr) IV Continuous <Continuous>  dextrose 5%. 1000 milliLiter(s) (100 mL/Hr) IV Continuous <Continuous>  dextrose 50% Injectable 25 Gram(s) IV Push once  dextrose 50% Injectable 12.5 Gram(s) IV Push once  dextrose 50% Injectable 25 Gram(s) IV Push once  enoxaparin Injectable 40 milliGRAM(s) SubCutaneous daily  glucagon  Injectable 1 milliGRAM(s) IntraMuscular once  insulin lispro (ADMELOG) corrective regimen sliding scale   SubCutaneous Before meals and at bedtime  levothyroxine Injectable 50 MICROGram(s) IV Push <User Schedule>    MEDICATIONS  (PRN):  acetaminophen   Tablet .. 650 milliGRAM(s) Oral every 6 hours PRN Mild Pain (1 - 3)  ALBUTerol    90 MICROgram(s) HFA Inhaler 2 Puff(s) Inhalation every 6 hours PRN Shortness of Breath and/or Wheezing      Allergies    No Known Allergies    Intolerances          REVIEW OF SYSTEMS: Unable to obtain due to pt's current mental status     Vital Signs Last 24 Hrs  T(C): 37.8 (15 Aug 2021 12:00), Max: 39.1 (14 Aug 2021 19:00)  T(F): 100 (15 Aug 2021 12:00), Max: 102.4 (14 Aug 2021 19:00)  HR: 79 (15 Aug 2021 12:00) (78 - 90)  BP: 119/57 (15 Aug 2021 12:00) (86/56 - 161/65)  BP(mean): 82 (15 Aug 2021 12:00) (66 - 98)  RR: 22 (15 Aug 2021 12:00) (17 - 39)  SpO2: 99% (15 Aug 2021 12:00) (96% - 100%)    PHYSICAL EXAM:  GENERAL: NAD, elderly gentleman, confused   HEENT:  anicteric, moist mucous membranes  CHEST/LUNG:  CTA b/l, no rales, wheezes, or rhonchi  HEART:  RRR, S1, S2  ABDOMEN:  BS+, soft, nontender, nondistended  EXTREMITIES: no edema, cyanosis, or calf tenderness  NERVOUS SYSTEM: unable to assess due to pt's current mental status, AAOx0-1       LABS:                        12.2   7.42  )-----------( 249      ( 15 Aug 2021 05:40 )             33.5     CBC Full  -  ( 15 Aug 2021 05:40 )  WBC Count : 7.42 K/uL  Hemoglobin : 12.2 g/dL  Hematocrit : 33.5 %  Platelet Count - Automated : 249 K/uL  Mean Cell Volume : 79.6 fl  Mean Cell Hemoglobin : 29.0 pg  Mean Cell Hemoglobin Concentration : 36.4 gm/dL  Auto Neutrophil # : 3.99 K/uL  Auto Lymphocyte # : 2.66 K/uL  Auto Monocyte # : 0.64 K/uL  Auto Eosinophil # : 0.08 K/uL  Auto Basophil # : 0.03 K/uL  Auto Neutrophil % : 53.8 %  Auto Lymphocyte % : 35.8 %  Auto Monocyte % : 8.6 %  Auto Eosinophil % : 1.1 %  Auto Basophil % : 0.4 %    15 Aug 2021 11:40    121    |  x      |  x      ----------------------------<  x      x       |  x      |  x        Ca    8.1        15 Aug 2021 05:40  Phos  3.9       15 Aug 2021 05:40  Mg     2.0       15 Aug 2021 05:40    TPro  7.3    /  Alb  3.4    /  TBili  0.9    /  DBili  x      /  AST  160    /  ALT  64     /  AlkPhos  88     15 Aug 2021 05:40      Urinalysis Basic - ( 13 Aug 2021 22:15 )    Color: Yellow / Appearance: Clear / S.015 / pH: x  Gluc: x / Ketone: Negative  / Bili: Negative / Urobili: Negative   Blood: x / Protein: Negative / Nitrite: Negative   Leuk Esterase: Negative / RBC: 0-2 /HPF / WBC 0-2   Sq Epi: x / Non Sq Epi: Occasional / Bacteria: Occasional      CAPILLARY BLOOD GLUCOSE      POCT Blood Glucose.: 136 mg/dL (15 Aug 2021 11:32)  POCT Blood Glucose.: 73 mg/dL (15 Aug 2021 08:19)  POCT Blood Glucose.: 85 mg/dL (14 Aug 2021 21:36)  POCT Blood Glucose.: 96 mg/dL (14 Aug 2021 17:53)          RADIOLOGY & ADDITIONAL TESTS:   < from: CT Head No Cont (21 @ 16:05) >  IMPRESSION:    1)  large pituitary macroadenoma, which may be further assessed and delineated with MR imaging. See above.  2)  no acute cerebral infarct, hemorrhage, or collections identified.    The study is degraded by motion.        Personally reviewed.     Consultant(s) Notes Reviewed:  [x] YES  [ ] NO

## 2021-08-15 NOTE — PROGRESS NOTE ADULT - ASSESSMENT
61 year old male PMHx DM2, Hypothyroid, Anemia ( Fe Def). Admitted to ICU for hyponatremia suspect hypovolemic hyponatremia vs SIADH. CT Head showing pituitary macroadenoma.    Neuro:   Hypovolemic Hyponatremia vs SIADH  Encephalopathy suspect metabolic 2/2 hyponatremia   -FU MRI head     CV:   -no hx of HTN  -monitor     Resp:   Rhino/enterovirus, +cough, BL Wheezing  -Albuterol nebulizer PRN    GI  -diet consistent carb diet with evening snack  -fluid restrict to 1000 cc/24hrs     Renal:   -Hypovolemic hyponatremia (multiple episode of vomiting, decreased PO intake x 1 week in setting of rhinovirus) vs euvolemic hyponatremia SIADH induced by N/V   -uric acid 2.6 suspect SIADH   -Sp DDAVP  -Na 116--1L NS >> 119--LR @ 50 cc/hr >>120--NaCl 3% >>122  - Avoid rapid correction. Goal correction of 8-10 meq/24 hours.   - Nephro Dr. Zarate following, recs appreciated  -fluid restrict to 1000 cc/24 hrs  -felix monitor I & Os  -NaCl tablets  -FU repeat Na levels     ID:   +Rhino/Entero Virus   - supportive care   -contact/isolation precautions  -FU C.Diff PCR    -FU Legionella Urine Antigen    Heme:   Chronic microcytic anemia, iron deficiency anemia   -H/H stable  -continue home iron pill   -DVT ppx: Lovenox 40 subq    Endo:   type 2 diabetes not on home insulin   -BGs Stable, continue finger sticks  -hold home metformin  -ISS   -hypoglycemia protocol  -F/u A1c  -Consitent carb diet    Hypothyroidism (Chronic)  - On synthroid 75mcg qd  - F/u TSH     Pituitary adenoma  -FU labs: IGF1, ACTG, TSH, Prolactin, Cortisol    -Skin: lines/felix: felix    Dispo  - code status: FULL CODE

## 2021-08-15 NOTE — PROGRESS NOTE ADULT - PROBLEM SELECTOR PLAN 3
pt with 6 episodes of loose, non-formed stool, decreased appetite on admission  -Febrile today  GI PCR, stool culture, C diff pcr ordered, f/u results  - Management as per ICU - CT Head ordered, large pituitary macroadenoma, which may be further assessed and delineated with MR imaging. No acute cerebral infarct, hemorrhage, or collections identified.  - MRI Head ordered, f/u results   - F/u IGF1, ACTG, TSH, Prolactin, Cortisol  - Monitor Na level  - Management as per ICU

## 2021-08-15 NOTE — PROGRESS NOTE ADULT - ATTENDING COMMENTS
60 yo man with Hx DM2, hypothyroid presenting with nausea, anorexia, diarrhea and then progressive confusion, found to have severe hyponatremia and +rhino/enterovirus. Suspect mostly SIADH, and now found to have pituitary macroadenoma.    --encephalopathy likely due to hyponatremia  slightly improved today  --pituitary macroadenoma  MRI with gadolinum   check prolactic, IGF1, ACTH, TFTs, 24h urine cortisol  endo, neuro eval appreciated  --stable from hemodynamic and respiratory standpoint  --hyponatremia correcting slowly  goal correction 8-10 in 24h  now able to take PO, continue salt tabs x 2 doses  check Na q6-8h  1L fluid restriction  maintain felix for strict I/O  --tolerating diet today  --rhino/enterovirus infection, supportive care  --DM2, ISS  hypoglycemic but now taking PO, encourage PO to avoid requiring D5  --plan discussed with son and wife at bedside  --discussed with renal

## 2021-08-15 NOTE — PROGRESS NOTE ADULT - PROBLEM SELECTOR PLAN 7
DVT PPX: Lovenox  - Management as per ICU Chronic  - On synthroid 75mcg qd  - F/u TSH   - Management as per ICU

## 2021-08-15 NOTE — PROGRESS NOTE ADULT - PROBLEM SELECTOR PLAN 6
Chronic  - On synthroid 75mcg qd  - F/u TSH   - Management as per ICU Chronic, stable  - Takes metformin bid at home, hold all oral anti-diabetic agents  - ldss, accu-checks, hypoglycemia protocol  - F/u A1c  - Management as per ICU

## 2021-08-15 NOTE — PROGRESS NOTE ADULT - SUBJECTIVE AND OBJECTIVE BOX
Patient is a 61y old  Male who presents with a chief complaint of Hyponatremia.    24 hour events: Febrile to 102.4, resolved with acetaminophen.     REVIEW OF SYSTEMS unable to obtain 2/2 metabolic encephalopathy hyponatremia       Vital Signs Last 24 Hrs  T(C): 38 (15 Aug 2021 09:00), Max: 39.1 (14 Aug 2021 19:00)  T(F): 100.4 (15 Aug 2021 09:00), Max: 102.4 (14 Aug 2021 19:00)  HR: 85 (15 Aug 2021 11:00) (78 - 90)  BP: 134/77 (15 Aug 2021 11:00) (86/56 - 161/65)  BP(mean): 98 (15 Aug 2021 11:00) (66 - 98)  RR: 30 (15 Aug 2021 11:00) (17 - 39)  SpO2: 98% (15 Aug 2021 11:00) (96% - 100%)    08-15    121<L>  |  90   |  10   ----------------------------<  74   4.2    |  20   |  0.76     Ca    8.1<L>      15 Aug 2021 05:40  Phos  3.9     08-15  Mg     2.0     -15    TPro  7.3  /  Alb  3.4  /  TBili  0.9  /  DBili  x   /  AST  160<H>  /  ALT  64  /  AlkPhos  88  08-15    CBC Full  -  ( 15 Aug 2021 05:40 )  WBC Count : 7.42 K/uL  RBC Count : 4.21 M/uL  Hemoglobin : 12.2 g/dL  Hematocrit : 33.5 %  Platelet Count - Automated : 249 K/uL  Mean Cell Volume : 79.6 fl  Mean Cell Hemoglobin : 29.0 pg  Mean Cell Hemoglobin Concentration : 36.4 gm/dL  Auto Neutrophil # : 3.99 K/uL  Auto Lymphocyte # : 2.66 K/uL  Auto Monocyte # : 0.64 K/uL  Auto Eosinophil # : 0.08 K/uL  Auto Basophil # : 0.03 K/uL  Auto Neutrophil % : 53.8 %  Auto Lymphocyte % : 35.8 %  Auto Monocyte % : 8.6 %  Auto Eosinophil % : 1.1 %  Auto Basophil % : 0.4 %        PHYSICAL EXAM    General: moving aorund in bed, confused   CNS: A & O x1, confused, nonfocal, moves all extremities   HEENT: dry mucous membranes, pupils equal, reactive to light.  Symmetric. No JVD.   PULM: Clear to auscultation bilaterally, no significant sputum production  CVS: Regular rate and rhythm, no murmurs, rubs, or gallops  ABD: Soft, nondistended, nontender, normoactive bowel sounds, no masses  EXT: No edema, nontender.   SKIN: Warm and well perfused, soft restraints on b/l UEs   Tubes lines: left peripheral line 18 gauge +Burgos     MEDICATIONS      dextrose 40% Gel Oral  dextrose 50% Injectable IV Push  dextrose 50% Injectable IV Push  dextrose 50% Injectable IV Push  glucagon  Injectable IntraMuscular  insulin lispro (ADMELOG) corrective regimen sliding scale SubCutaneous  levothyroxine Oral      enoxaparin Injectable SubCutaneous        dextrose 5%. IV Continuous  dextrose 5%. IV Continuous  lactated ringers. IV Continuous        Urinalysis Basic - ( 13 Aug 2021 22:15 )    Color: Yellow / Appearance: Clear / S.015 / pH: x  Gluc: x / Ketone: Negative  / Bili: Negative / Urobili: Negative   Blood: x / Protein: Negative / Nitrite: Negative   Leuk Esterase: Negative / RBC: 0-2 /HPF / WBC 0-2   Sq Epi: x / Non Sq Epi: Occasional / Bacteria: Occasional        Rapid RVP Result: Detected ( @ 22:15)    Radiology: *  EXAM:  XR CHEST PA LAT 2V                            PROCEDURE DATE:  2021          INTERPRETATION:  Admission.    PA lateral.    Normal heart mediastinum. Calcified aortic arch. Question small vague nodular opacity left base. Correlate with chest CT. No acute infiltrate or pleural collection.    IMPRESSION: No acute infiltrates. Question small left basilar nodule. Correlate with chest CT        CENTRAL LINE: no   Burgos: yes (8/15)  a line: No     GLOBAL ISSUE/BEST PRACTICE  Analgesia: n/a   Sedation: n/a   HOB elevation: yes  Stress ulcer prophylaxis:   VTE prophylaxis: lovenox   Glycemic control: 90s   Nutrition: fluid restrict to 1000 cc/24 hrs     CODE STATUS: FULL CODE          Patient is a 61y old  Male who presents with a chief complaint of Hyponatremia.    24 hour events: Febrile to 102.4, resolved with acetaminophen.   per family he is now able to recognize them (unable to yesterday), but remains confused    REVIEW OF SYSTEMS unable to obtain 2/2 metabolic encephalopathy hyponatremia       Vital Signs Last 24 Hrs  T(C): 38 (15 Aug 2021 09:00), Max: 39.1 (14 Aug 2021 19:00)  T(F): 100.4 (15 Aug 2021 09:00), Max: 102.4 (14 Aug 2021 19:00)  HR: 85 (15 Aug 2021 11:00) (78 - 90)  BP: 134/77 (15 Aug 2021 11:00) (86/56 - 161/65)  BP(mean): 98 (15 Aug 2021 11:00) (66 - 98)  RR: 30 (15 Aug 2021 11:00) (17 - 39)  SpO2: 98% (15 Aug 2021 11:00) (96% - 100%)    08-15    121<L>  |  90   |  10   ----------------------------<  74   4.2    |  20   |  0.76     Ca    8.1<L>      15 Aug 2021 05:40  Phos  3.9     08-15  Mg     2.0     08-15    TPro  7.3  /  Alb  3.4  /  TBili  0.9  /  DBili  x   /  AST  160<H>  /  ALT  64  /  AlkPhos  88  08-15    CBC Full  -  ( 15 Aug 2021 05:40 )  WBC Count : 7.42 K/uL  RBC Count : 4.21 M/uL  Hemoglobin : 12.2 g/dL  Hematocrit : 33.5 %  Platelet Count - Automated : 249 K/uL  Mean Cell Volume : 79.6 fl  Mean Cell Hemoglobin : 29.0 pg  Mean Cell Hemoglobin Concentration : 36.4 gm/dL  Auto Neutrophil # : 3.99 K/uL  Auto Lymphocyte # : 2.66 K/uL  Auto Monocyte # : 0.64 K/uL  Auto Eosinophil # : 0.08 K/uL  Auto Basophil # : 0.03 K/uL  Auto Neutrophil % : 53.8 %  Auto Lymphocyte % : 35.8 %  Auto Monocyte % : 8.6 %  Auto Eosinophil % : 1.1 %  Auto Basophil % : 0.4 %        PHYSICAL EXAM    General: moving aorund in bed, confused   CNS: A & O x1, confused, nonfocal, moves all extremities   HEENT: dry mucous membranes, pupils equal, reactive to light.  Symmetric. No JVD.   PULM: Clear to auscultation bilaterally, no significant sputum production  CVS: Regular rate and rhythm, no murmurs, rubs, or gallops  ABD: Soft, nondistended, nontender, normoactive bowel sounds, no masses  EXT: No edema, nontender.   SKIN: Warm and well perfused, soft restraints on b/l UEs   Tubes lines: left peripheral line 18 gauge +Burgos     MEDICATIONS      dextrose 40% Gel Oral  dextrose 50% Injectable IV Push  dextrose 50% Injectable IV Push  dextrose 50% Injectable IV Push  glucagon  Injectable IntraMuscular  insulin lispro (ADMELOG) corrective regimen sliding scale SubCutaneous  levothyroxine Oral      enoxaparin Injectable SubCutaneous        dextrose 5%. IV Continuous  dextrose 5%. IV Continuous  lactated ringers. IV Continuous        Urinalysis Basic - ( 13 Aug 2021 22:15 )    Color: Yellow / Appearance: Clear / S.015 / pH: x  Gluc: x / Ketone: Negative  / Bili: Negative / Urobili: Negative   Blood: x / Protein: Negative / Nitrite: Negative   Leuk Esterase: Negative / RBC: 0-2 /HPF / WBC 0-2   Sq Epi: x / Non Sq Epi: Occasional / Bacteria: Occasional        Rapid RVP Result: Detected ( @ 22:15)  < from: CT Head No Cont (21 @ 16:05) >    FINDINGS:    HEMISPHERES:  This study is degraded by motion. No acute infarct or hemorrhagic lesion is identified.  VENTRICLES:  Midline and normal in size.  POSTERIOR FOSSA:  The brain stem and cerebellum are unremarkable.  No CP angle lesion noted.  EXTRACEREBRAL SPACES:  No subdural or epidural collections are noted.  SKULL BASE AND CALVARIUM:  Appears intact.  No fracture or destructive lesion is identified.  SINUSES AND MASTOIDS:  Clear.  MISCELLANEOUS:  A pituitary macroadenoma is identified on the left, measuring 2.6 cm in height. There is an enlarged, and remodeled pituitary fossa. The lesion is eccentric, more prominent on the left. Cavernous sinus invasion cannot be totally excluded. This may be further assessed with MR imaging.    IMPRESSION:    1)  large pituitary macroadenoma, which may be further assessed and delineated with MR imaging. See above.  2)  no acute cerebral infarct, hemorrhage, or collections identified.    The study is degraded by motion.    --- End of Report ---    < end of copied text >    Radiology: *  EXAM:  XR CHEST PA LAT 2V                            PROCEDURE DATE:  2021          INTERPRETATION:  Admission.    PA lateral.    Normal heart mediastinum. Calcified aortic arch. Question small vague nodular opacity left base. Correlate with chest CT. No acute infiltrate or pleural collection.    IMPRESSION: No acute infiltrates. Question small left basilar nodule. Correlate with chest CT        CENTRAL LINE: no   Burgos: yes (8/15)  a line: No     GLOBAL ISSUE/BEST PRACTICE  Analgesia: n/a   Sedation: n/a   HOB elevation: yes  Stress ulcer prophylaxis:   VTE prophylaxis: lovenox   Glycemic control: 90s   Nutrition: fluid restrict to 1000 cc/24 hrs     CODE STATUS: FULL CODE

## 2021-08-15 NOTE — PROGRESS NOTE ADULT - ASSESSMENT
Hyponatremia: SIADH  Hypertension  Change in mental status  Diabetes    Repeat labs. Salt tabs if BP remains stable. Monitor BP trend. Titrate BP meds as needed. Monitor blood sugar levels. Insulin coverage as needed. D/w family at bedside. D/w ICU team.

## 2021-08-15 NOTE — PROGRESS NOTE ADULT - PROBLEM SELECTOR PLAN 1
pt with 6 episodes of loose, non-formed stool, decreased appetite on admission   - Na 116 on admission, symptomatic hyponatremia likey. SIADH   - Enterovirus positive--likely etiology of symptoms; contact and droplet precaution   - GI PCR, stool culture, C diff pcr ordered, f/u results   - Na 116-->119-->122  - s/p 1L ns bolus x 1 on admission, s/p LR, s/p DDAVP   - Start NaCl tablets q6h x2 doses   - F/u BMP at 20:00   - Avoid rapid correction. Goal correction of 8-10 meq/24 hours.   - Nephro Dr. Zarate following, recs appreciated  - Management as per ICU pt with 6 episodes of loose, non-formed stool, decreased appetite on admission   - Na 116 on admission, symptomatic hyponatremia likey SIADH   - Enterovirus positive--likely etiology of symptoms; contact and droplet precaution, albuterol prn   - GI PCR, stool culture, C diff pcr ordered, f/u results  - Na 116-->119-->122-->121  - s/p 1L ns bolus x 1 on admission, s/p LR, s/p DDAVP   - Start NaCl tablets q6h x2 doses   - Avoid rapid correction. Goal correction of 8-10 meq/24 hours.   - Fluid restriction to 1000 cc/24 hrs  - Nephro Dr. Zarate following, recs appreciated  - Monitor Na level   - Management as per ICU

## 2021-08-15 NOTE — PROGRESS NOTE ADULT - PROBLEM SELECTOR PLAN 2
pt with ams on admission, aaox1-2, wife states this is not his baseline  - likely 2/2 hyponatremia  - CT Head ordered, f/u results   - Aspiration and seizure precautions  - Monitor sodium   - s/p Ativan, now on Precedex   - Management as per ICU pt with ams on admission, aaox1-2, wife states this is not his baseline  - likely 2/2 hyponatremia  - CT Head ordered, large pituitary macroadenoma, which may be further assessed and delineated with MR imaging. No acute cerebral infarct, hemorrhage, or collections identified.  - MRI Head ordered, f/u results   - Aspiration and seizure precautions  - Monitor sodium   - s/p Ativan, now on Precedex   - Management as per ICU pt with ams on admission, aaox1-2, wife states this is not his baseline  - likely 2/2 hyponatremia  - CT Head ordered, large pituitary macroadenoma, which may be further assessed and delineated with MR imaging. No acute cerebral infarct, hemorrhage, or collections identified.  - MRI Head ordered, f/u results   - Aspiration and seizure precautions  - Monitor sodium   - Management as per ICU

## 2021-08-15 NOTE — PROGRESS NOTE ADULT - ASSESSMENT
62 y/o M with PMHx of T2DM, Iron deficiency anemia, Hypothyroidism presenting nausea, poor PO intake, and fatigue for the past 3 days. Admitted to the ICU for metabolic encephalopathy likely 2/2 symptomatic hyponatremia in the setting of enterovirus infection, and diarrhea.  60 y/o M with PMHx of T2DM, Iron deficiency anemia, Hypothyroidism presenting nausea, poor PO intake, and fatigue for the past 3 days. Admitted to the ICU for metabolic encephalopathy likely 2/2 symptomatic hyponatremia in the setting of enterovirus infection, and diarrhea. Found to have a pituitary adenoma.

## 2021-08-15 NOTE — PROGRESS NOTE ADULT - PROBLEM SELECTOR PLAN 4
Chronic, per wife pt takes iron daily at home  - Hb 10.5 on admission, microcytic  - Continue home Iron 325mg qd  - monitor for s/sx of bleed and transfuse prn   - Management as per ICU pt with 6 episodes of loose, non-formed stool, decreased appetite on admission  - Febrile today 100.4 F  - GI PCR, stool culture, C diff pcr ordered, f/u results  - Management as per ICU

## 2021-08-16 LAB
ANION GAP SERPL CALC-SCNC: 7 MMOL/L — SIGNIFICANT CHANGE UP (ref 5–17)
ANION GAP SERPL CALC-SCNC: 9 MMOL/L — SIGNIFICANT CHANGE UP (ref 5–17)
BASOPHILS # BLD AUTO: 0.04 K/UL — SIGNIFICANT CHANGE UP (ref 0–0.2)
BASOPHILS NFR BLD AUTO: 0.5 % — SIGNIFICANT CHANGE UP (ref 0–2)
BUN SERPL-MCNC: 8 MG/DL — SIGNIFICANT CHANGE UP (ref 7–23)
BUN SERPL-MCNC: 9 MG/DL — SIGNIFICANT CHANGE UP (ref 7–23)
CALCIUM SERPL-MCNC: 7.8 MG/DL — LOW (ref 8.5–10.1)
CALCIUM SERPL-MCNC: 7.9 MG/DL — LOW (ref 8.5–10.1)
CHLORIDE SERPL-SCNC: 93 MMOL/L — LOW (ref 96–108)
CHLORIDE SERPL-SCNC: 95 MMOL/L — LOW (ref 96–108)
CO2 SERPL-SCNC: 22 MMOL/L — SIGNIFICANT CHANGE UP (ref 22–31)
CO2 SERPL-SCNC: 23 MMOL/L — SIGNIFICANT CHANGE UP (ref 22–31)
CORTIS SP2 SERPL-MCNC: 3.9 UG/DL — SIGNIFICANT CHANGE UP
CORTIS SP2 SERPL-MCNC: 9.4 UG/DL — SIGNIFICANT CHANGE UP
CREAT SERPL-MCNC: 0.61 MG/DL — SIGNIFICANT CHANGE UP (ref 0.5–1.3)
CREAT SERPL-MCNC: 0.7 MG/DL — SIGNIFICANT CHANGE UP (ref 0.5–1.3)
EOSINOPHIL # BLD AUTO: 0.14 K/UL — SIGNIFICANT CHANGE UP (ref 0–0.5)
EOSINOPHIL NFR BLD AUTO: 1.9 % — SIGNIFICANT CHANGE UP (ref 0–6)
GLUCOSE SERPL-MCNC: 118 MG/DL — HIGH (ref 70–99)
GLUCOSE SERPL-MCNC: 176 MG/DL — HIGH (ref 70–99)
HCT VFR BLD CALC: 28.8 % — LOW (ref 39–50)
HGB BLD-MCNC: 10.4 G/DL — LOW (ref 13–17)
IMM GRANULOCYTES NFR BLD AUTO: 0.3 % — SIGNIFICANT CHANGE UP (ref 0–1.5)
INSULIN-LIKE GROWTH FACTOR 1 INTERPRETATION: SIGNIFICANT CHANGE UP
INSULIN-LIKE GROWTH FACTOR 1: 28 NG/ML — LOW (ref 46–219)
LEGIONELLA AG UR QL: NEGATIVE — SIGNIFICANT CHANGE UP
LYMPHOCYTES # BLD AUTO: 2.72 K/UL — SIGNIFICANT CHANGE UP (ref 1–3.3)
LYMPHOCYTES # BLD AUTO: 37.1 % — SIGNIFICANT CHANGE UP (ref 13–44)
MAGNESIUM SERPL-MCNC: 1.9 MG/DL — SIGNIFICANT CHANGE UP (ref 1.6–2.6)
MCHC RBC-ENTMCNC: 28.7 PG — SIGNIFICANT CHANGE UP (ref 27–34)
MCHC RBC-ENTMCNC: 36.1 GM/DL — HIGH (ref 32–36)
MCV RBC AUTO: 79.6 FL — LOW (ref 80–100)
MONOCYTES # BLD AUTO: 0.58 K/UL — SIGNIFICANT CHANGE UP (ref 0–0.9)
MONOCYTES NFR BLD AUTO: 7.9 % — SIGNIFICANT CHANGE UP (ref 2–14)
NEUTROPHILS # BLD AUTO: 3.84 K/UL — SIGNIFICANT CHANGE UP (ref 1.8–7.4)
NEUTROPHILS NFR BLD AUTO: 52.3 % — SIGNIFICANT CHANGE UP (ref 43–77)
NRBC # BLD: 0 /100 WBCS — SIGNIFICANT CHANGE UP (ref 0–0)
PHOSPHATE SERPL-MCNC: 2.9 MG/DL — SIGNIFICANT CHANGE UP (ref 2.5–4.5)
PLATELET # BLD AUTO: 220 K/UL — SIGNIFICANT CHANGE UP (ref 150–400)
POTASSIUM SERPL-MCNC: 3.9 MMOL/L — SIGNIFICANT CHANGE UP (ref 3.5–5.3)
POTASSIUM SERPL-MCNC: 4 MMOL/L — SIGNIFICANT CHANGE UP (ref 3.5–5.3)
POTASSIUM SERPL-SCNC: 3.9 MMOL/L — SIGNIFICANT CHANGE UP (ref 3.5–5.3)
POTASSIUM SERPL-SCNC: 4 MMOL/L — SIGNIFICANT CHANGE UP (ref 3.5–5.3)
RBC # BLD: 3.62 M/UL — LOW (ref 4.2–5.8)
RBC # FLD: 12.9 % — SIGNIFICANT CHANGE UP (ref 10.3–14.5)
SODIUM SERPL-SCNC: 124 MMOL/L — LOW (ref 135–145)
SODIUM SERPL-SCNC: 125 MMOL/L — LOW (ref 135–145)
WBC # BLD: 7.34 K/UL — SIGNIFICANT CHANGE UP (ref 3.8–10.5)
WBC # FLD AUTO: 7.34 K/UL — SIGNIFICANT CHANGE UP (ref 3.8–10.5)

## 2021-08-16 PROCEDURE — 99291 CRITICAL CARE FIRST HOUR: CPT

## 2021-08-16 PROCEDURE — 99233 SBSQ HOSP IP/OBS HIGH 50: CPT

## 2021-08-16 RX ORDER — LIDOCAINE 4 G/100G
1 CREAM TOPICAL DAILY
Refills: 0 | Status: DISCONTINUED | OUTPATIENT
Start: 2021-08-16 | End: 2021-08-18

## 2021-08-16 RX ORDER — KETOROLAC TROMETHAMINE 30 MG/ML
15 SYRINGE (ML) INJECTION ONCE
Refills: 0 | Status: DISCONTINUED | OUTPATIENT
Start: 2021-08-16 | End: 2021-08-16

## 2021-08-16 RX ORDER — COSYNTROPIN 0.25 MG/ML
0.25 INJECTION, SOLUTION INTRAVENOUS ONCE
Refills: 0 | Status: COMPLETED | OUTPATIENT
Start: 2021-08-16 | End: 2021-08-16

## 2021-08-16 RX ORDER — LIDOCAINE 4 G/100G
1 CREAM TOPICAL DAILY
Refills: 0 | Status: DISCONTINUED | OUTPATIENT
Start: 2021-08-16 | End: 2021-08-16

## 2021-08-16 RX ORDER — SODIUM CHLORIDE 9 MG/ML
2 INJECTION INTRAMUSCULAR; INTRAVENOUS; SUBCUTANEOUS EVERY 6 HOURS
Refills: 0 | Status: COMPLETED | OUTPATIENT
Start: 2021-08-16 | End: 2021-08-16

## 2021-08-16 RX ADMIN — ACYCLOVIR 1 APPLICATION(S): 50 OINTMENT TOPICAL at 21:40

## 2021-08-16 RX ADMIN — ENOXAPARIN SODIUM 40 MILLIGRAM(S): 100 INJECTION SUBCUTANEOUS at 11:27

## 2021-08-16 RX ADMIN — ACYCLOVIR 1 APPLICATION(S): 50 OINTMENT TOPICAL at 05:08

## 2021-08-16 RX ADMIN — Medication 15 MILLIGRAM(S): at 11:42

## 2021-08-16 RX ADMIN — Medication 1: at 21:40

## 2021-08-16 RX ADMIN — SODIUM CHLORIDE 2 GRAM(S): 9 INJECTION INTRAMUSCULAR; INTRAVENOUS; SUBCUTANEOUS at 11:27

## 2021-08-16 RX ADMIN — Medication 5 MILLIGRAM(S): at 21:40

## 2021-08-16 RX ADMIN — COSYNTROPIN 0.25 MILLIGRAM(S): 0.25 INJECTION, SOLUTION INTRAVENOUS at 12:19

## 2021-08-16 RX ADMIN — Medication 1000 MILLIGRAM(S): at 00:53

## 2021-08-16 RX ADMIN — LIDOCAINE 1 PATCH: 4 CREAM TOPICAL at 20:11

## 2021-08-16 RX ADMIN — Medication 1: at 17:29

## 2021-08-16 RX ADMIN — ACYCLOVIR 1 APPLICATION(S): 50 OINTMENT TOPICAL at 12:20

## 2021-08-16 RX ADMIN — Medication 15 MILLIGRAM(S): at 11:27

## 2021-08-16 RX ADMIN — Medication 50 MICROGRAM(S): at 11:28

## 2021-08-16 RX ADMIN — LIDOCAINE 1 PATCH: 4 CREAM TOPICAL at 11:27

## 2021-08-16 RX ADMIN — SODIUM CHLORIDE 2 GRAM(S): 9 INJECTION INTRAMUSCULAR; INTRAVENOUS; SUBCUTANEOUS at 17:09

## 2021-08-16 NOTE — PROGRESS NOTE ADULT - PROBLEM SELECTOR PLAN 1
pt with 6 episodes of loose, non-formed stool, decreased appetite on admission   - Na 116 on admission, symptomatic hyponatremia likey SIADH   - Enterovirus positive--likely etiology of symptoms; contact and droplet precaution, albuterol prn   - GI PCR, stool culture, C diff pcr ordered, f/u results  - Na 125 today   - s/p 1L ns bolus x 1 on admission, s/p LR, s/p DDAVP   - S/p NaCl tablets q6h x2 doses   - Avoid rapid correction. Goal correction of 8-10 meq/24 hours.   - Fluid restriction to 1000 cc/24 hrs  - Nephro Dr. Zarate following, recs appreciated  - Monitor Na level   - Management as per ICU

## 2021-08-16 NOTE — PROGRESS NOTE ADULT - SUBJECTIVE AND OBJECTIVE BOX
Neurology follow up note    SAMANTHA FISHER61yMale      Interval History:    Patient seen with spouse LBP pain     Allergies    No Known Allergies    Intolerances        MEDICATIONS    acetaminophen   Tablet .. 650 milliGRAM(s) Oral every 6 hours PRN  acyclovir Topical 5% Ointment 1 Application(s) Topical three times a day  ALBUTerol    90 MICROgram(s) HFA Inhaler 2 Puff(s) Inhalation every 6 hours PRN  cosyntropin Injectable 0.25 milliGRAM(s) IV Push once  dextrose 40% Gel 15 Gram(s) Oral once  dextrose 5%. 1000 milliLiter(s) IV Continuous <Continuous>  dextrose 5%. 1000 milliLiter(s) IV Continuous <Continuous>  dextrose 50% Injectable 25 Gram(s) IV Push once  dextrose 50% Injectable 12.5 Gram(s) IV Push once  dextrose 50% Injectable 25 Gram(s) IV Push once  enoxaparin Injectable 40 milliGRAM(s) SubCutaneous daily  glucagon  Injectable 1 milliGRAM(s) IntraMuscular once  insulin lispro (ADMELOG) corrective regimen sliding scale   SubCutaneous Before meals and at bedtime  ketorolac   Injectable 15 milliGRAM(s) IV Push once  levothyroxine Injectable 50 MICROGram(s) IV Push <User Schedule>  lidocaine   5% Patch 1 Patch Transdermal daily  melatonin 5 milliGRAM(s) Oral at bedtime  sodium chloride 2 Gram(s) Oral every 6 hours              Vital Signs Last 24 Hrs  T(C): 37.1 (16 Aug 2021 05:00), Max: 37.8 (15 Aug 2021 12:00)  T(F): 98.8 (16 Aug 2021 05:00), Max: 100 (15 Aug 2021 12:00)  HR: 86 (16 Aug 2021 09:00) (74 - 91)  BP: 136/97 (16 Aug 2021 09:00) (104/50 - 165/68)  BP(mean): 110 (16 Aug 2021 09:00) (72 - 110)  RR: 28 (16 Aug 2021 09:00) (14 - 44)  SpO2: 98% (16 Aug 2021 09:00) (96% - 100%)      REVIEW OF SYSTEMS:  Constitutional:  Positive history of fevers.  Head:  No headaches.  Eyes:  Positive history of poor vision out of the right eye.  Ears:  No ringing in the ears.  Neck:  No neck pain.  Respiratory:  No shortness of breath.  Cardiovascular:  No chest pain.  Abdomen:  Positive history of nausea, vomiting, and diarrhea.  Musculoskeletal:  Positive history of spasms.  Extremities/Neurological:  Possibly underlying neuropathy.  Genitourinary:  No burning upon urination.  All information was obtained from spouse and from the patient.    PHYSICAL EXAMINATION:    HEENT:  Head:  Normocephalic and atraumatic.  Eyes:  No scleral icterus.  Ears:  Hearing bilaterally intact.  NECK:  Supple.  RESPIRATORY:  Decreased breath sounds bilaterally.  CARDIOVASCULAR:  S1 and S2 heard.  ABDOMEN:  Soft and nontender.  EXTREMITIES:  No clubbing or cyanosis were noted.      NEUROLOGIC:  The patient was awake in bed.  Location was hospital, but as per my conversation with spouse, she states that his mental status is better today as compared to yesterday.  Extraocular movements were intact, does have poor vision out of the right eye, blurry vision to begin with, not new.  Speech was fluent.  Smile symmetric.  Motor:  Bilateral upper 4/5, bilateral lower 3-/5.  Sensory:  Bilateral upper and lower intact to light touch.            LABS:  CBC Full  -  ( 16 Aug 2021 06:54 )  WBC Count : 7.34 K/uL  RBC Count : 3.62 M/uL  Hemoglobin : 10.4 g/dL  Hematocrit : 28.8 %  Platelet Count - Automated : 220 K/uL  Mean Cell Volume : 79.6 fl  Mean Cell Hemoglobin : 28.7 pg  Mean Cell Hemoglobin Concentration : 36.1 gm/dL  Auto Neutrophil # : 3.84 K/uL  Auto Lymphocyte # : 2.72 K/uL  Auto Monocyte # : 0.58 K/uL  Auto Eosinophil # : 0.14 K/uL  Auto Basophil # : 0.04 K/uL  Auto Neutrophil % : 52.3 %  Auto Lymphocyte % : 37.1 %  Auto Monocyte % : 7.9 %  Auto Eosinophil % : 1.9 %  Auto Basophil % : 0.5 %      08-16    125<L>  |  93<L>  |  8   ----------------------------<  118<H>  3.9   |  23  |  0.61    Ca    7.9<L>      16 Aug 2021 06:54  Phos  2.9     08-16  Mg     1.9     08-16    TPro  7.3  /  Alb  3.4  /  TBili  0.9  /  DBili  x   /  AST  160<H>  /  ALT  64  /  AlkPhos  88  08-15    Hemoglobin A1C:     LIVER FUNCTIONS - ( 15 Aug 2021 05:40 )  Alb: 3.4 g/dL / Pro: 7.3 g/dL / ALK PHOS: 88 U/L / ALT: 64 U/L / AST: 160 U/L / GGT: x           Vitamin B12         RADIOLOGY    ANALYSIS AND PLAN:  This is a 61-year-old with altered mental status and pituitary macroadenoma.  For episode of altered mental status, suspect this is metabolic encephalopathy which is multifactorial secondary to underlying septic type process along with hyponatremia.  Would recommend antibiotics as needed.  Infection workup as needed.  For hyponatremia, we will correct sodium by 10 millimoles a day to help prevent central pontine myelinolysis.  For history of pituitary macroadenoma, per my conversation with spouse, the patient did see an ophthalmologist recently, had peripheral field testing done which showed no abnormalities.  We will plan for MRI imaging of the brain.  Would recommend endocrinology followup as needed  For history of diabetes, strict control of blood sugars.  For neuropathy, strict control of blood sugars, as necessary can try gabapentin based on the patient's pain.  Spoke with spouse, Dora, at bedside.  Her telephone number is [phone]. 8/16/21  LBP will try lidoderm patch       Greater than 45 minutes of time was spent with the patient, plan of care, reviewing data, with greater than 50% of the visit was spent counseling and/or coordinating care with multidisciplinary healthcare team

## 2021-08-16 NOTE — PROGRESS NOTE ADULT - PROBLEM SELECTOR PLAN 2
pt with ams on admission, aaox1-2, wife states this is not his baseline  - likely 2/2 hyponatremia  - CT Head showed large pituitary macroadenoma, which may be further assessed and delineated with MR imaging. No acute cerebral infarct, hemorrhage, or collections identified.  - MRI Head ordered, f/u results   - Aspiration and seizure precautions  - Monitor sodium   - Management as per ICU

## 2021-08-16 NOTE — PROGRESS NOTE ADULT - PROBLEM SELECTOR PLAN 4
pt with 6 episodes of loose, non-formed stool, decreased appetite on admission  - Febrile today 100.4 F  - GI PCR, stool culture, C diff pcr ordered, f/u results  - Management as per ICU

## 2021-08-16 NOTE — PROGRESS NOTE ADULT - SUBJECTIVE AND OBJECTIVE BOX
Patient is a 61y old  Male who presents with a chief complaint of Hyponatremia (15 Aug 2021 12:54)       INTERVAL HPI/OVERNIGHT EVENTS: Seen and examined at bedside. Confused/lethargic. Won't answer questions     MEDICATIONS  (STANDING):  acyclovir Topical 5% Ointment 1 Application(s) Topical three times a day  dextrose 40% Gel 15 Gram(s) Oral once  dextrose 5%. 1000 milliLiter(s) (50 mL/Hr) IV Continuous <Continuous>  dextrose 5%. 1000 milliLiter(s) (100 mL/Hr) IV Continuous <Continuous>  dextrose 50% Injectable 25 Gram(s) IV Push once  dextrose 50% Injectable 12.5 Gram(s) IV Push once  dextrose 50% Injectable 25 Gram(s) IV Push once  enoxaparin Injectable 40 milliGRAM(s) SubCutaneous daily  glucagon  Injectable 1 milliGRAM(s) IntraMuscular once  insulin lispro (ADMELOG) corrective regimen sliding scale   SubCutaneous Before meals and at bedtime  levothyroxine Injectable 50 MICROGram(s) IV Push <User Schedule>  melatonin 5 milliGRAM(s) Oral at bedtime    MEDICATIONS  (PRN):  acetaminophen   Tablet .. 650 milliGRAM(s) Oral every 6 hours PRN Mild Pain (1 - 3)  ALBUTerol    90 MICROgram(s) HFA Inhaler 2 Puff(s) Inhalation every 6 hours PRN Shortness of Breath and/or Wheezing      Allergies    No Known Allergies    Intolerances        REVIEW OF SYSTEMS:  Unable to obtain due to mental status   Vital Signs Last 24 Hrs  T(C): 37.1 (16 Aug 2021 05:00), Max: 38 (15 Aug 2021 09:00)  T(F): 98.8 (16 Aug 2021 05:00), Max: 100.4 (15 Aug 2021 09:00)  HR: 79 (16 Aug 2021 07:00) (74 - 91)  BP: 114/58 (16 Aug 2021 07:00) (104/50 - 165/68)  BP(mean): 78 (16 Aug 2021 07:00) (72 - 102)  RR: 20 (16 Aug 2021 07:00) (14 - 44)  SpO2: 98% (16 Aug 2021 07:00) (96% - 100%)    PHYSICAL EXAM:  GENERAL: NAD, sleepy but responsive   HEAD:  Atraumatic, Normocephalic  EYES: EOMI, PERRLA, conjunctiva and sclera clear  ENMT: No tonsillar erythema, exudates, or enlargement  NECK: Supple, No JVD, Normal thyroid  CHEST/LUNG: Clear to auscultation bilaterally; No  wheezing  HEART: S1S2+,  Regular rate and rhythm  ABDOMEN: Soft, Nontender, Nondistended; Bowel sounds present  EXTREMITIES:  2+ Peripheral Pulses, No clubbing, cyanosis  LYMPH: No lymphadenopathy noted  SKIN: No rashes, WARM, DRY     LABS:                        10.4   7.34  )-----------( 220      ( 16 Aug 2021 06:54 )             28.8     16 Aug 2021 06:54    125    |  93     |  8      ----------------------------<  118    3.9     |  23     |  0.61     Ca    7.9        16 Aug 2021 06:54  Phos  2.9       16 Aug 2021 06:54  Mg     1.9       16 Aug 2021 06:54        CAPILLARY BLOOD GLUCOSE      POCT Blood Glucose.: 119 mg/dL (16 Aug 2021 08:08)  POCT Blood Glucose.: 159 mg/dL (15 Aug 2021 21:03)  POCT Blood Glucose.: 128 mg/dL (15 Aug 2021 16:14)  POCT Blood Glucose.: 136 mg/dL (15 Aug 2021 11:32)  POCT Blood Glucose.: 73 mg/dL (15 Aug 2021 08:19)    BLOOD CULTURE    RADIOLOGY & ADDITIONAL TESTS:    Imaging Personally Reviewed:  [ ] YES     Consultant(s) Notes Reviewed:      Care Discussed with Consultants/Other Providers:

## 2021-08-16 NOTE — PROGRESS NOTE ADULT - ASSESSMENT
60 y/o M with PMHx of T2DM, Iron deficiency anemia, Hypothyroidism presenting nausea, poor PO intake, and fatigue for the past 3 days. Admitted to the ICU for metabolic encephalopathy likely 2/2 symptomatic hyponatremia in the setting of enterovirus infection, and diarrhea. Found to have a pituitary adenoma.

## 2021-08-16 NOTE — PROGRESS NOTE ADULT - ASSESSMENT
Hyponatremia: SIADH  Hypertension  Change in mental status  Diabetes    Improving sodium levels. PO fluid restriction. Salt tabs as ordered. Monitor BP trend. Titrate BP meds as needed. Monitor blood sugar levels. Insulin coverage as needed.  D/w family at bedside. D/w ICU team.  Hyponatremia: SIADH, Adrenal insufficiency  Hypertension  Pituitary macroadenoma  Change in mental status  Diabetes    Improving sodium levels. PO fluid restriction. Salt tabs as ordered. Monitor BP trend. Titrate BP meds as needed. Monitor blood sugar levels. Insulin coverage as needed.  Endocrine follow up. D/w family at bedside. D/w ICU team.

## 2021-08-16 NOTE — CONSULT NOTE ADULT - PROBLEM SELECTOR RECOMMENDATION 9
check cosyntropin stim test - r/o adrenal insuff due to mechanical compression  to require increase in lt4 for goal ft4 mid normal range  mild elev prolactin due to mass effect  f/u full functional workup  check mri of pituitary/brain

## 2021-08-16 NOTE — PROGRESS NOTE ADULT - ASSESSMENT
61 year old male PMHx DM2, Hypothyroid, Anemia (Fe Def). Admitted to ICU for hyponatremia suspect hypovolemic hyponatremia vs SIADH. CT Head showing pituitary macroadenoma. Na levels increasing gradually. Normal TSH with low T3/4 suggest central hypothyroidism. Plan for cosyntropin stimulation test to assess for compressive hypopituitarism.      #Neuro:   Hypovolemic Hyponatremia vs SIADH  Encephalopathy suspect metabolic 2/2 hyponatremia   Pituitary Macroadenoma  -FU MRI head     #CV:   -no hx of HTN  -monitor     #Resp:   Rhino/enterovirus, +cough, BL Wheezing  -Albuterol nebulizer PRN    #GI  -diet consistent carb diet with evening snack  -fluid restrict to 1000 cc/24hrs     #Renal:   -Hypovolemic hyponatremia (multiple episode of vomiting, decreased PO intake x 1 week in setting of rhinovirus) vs euvolemic hyponatremia SIADH   -uric acid 2.6 suspect SIADH   -Sp DDAVP  -Na trending up 123>125  - Avoid rapid correction. Goal correction of 8-10 meq/24 hours.   - Nephro Dr. Zarate following, recs appreciated  -fluid restrict to 1000 cc/24 hrs  -felix monitor I & Os  -NaCl tablets while tolerating PO  -FU repeat Na levels     #ID:   +Rhino/Entero Virus   -contact/isolation precautions  - supportive care   -FU Legionella Urine Antigen    #Heme:   - microcytic anemia, iron deficiency anemia   -Hgb stable ~11  -continue home iron pill   -DVT ppx: Lovenox 40 subq    #Endo:   type 2 diabetes not on home insulin   -BGs >110  -hold home metformin  -ISS   -hypoglycemia protocol  -Consitent carb diet    Hypothyroidism (central vs peripheral)  - Normal TSH with low T3/4 suggests central  - On synthroid 75mcg qd    Pituitary adenoma  - FU labs: IGF1, ACTH  - FU Cosyntropin stim test 8/16    #MSK  Low back pain, BLLE pain   -Lidocaine patch  -Toradol single dose    #Skin: lines/felix: felix    #Dispo  - code status: FULL CODE

## 2021-08-16 NOTE — PROGRESS NOTE ADULT - SUBJECTIVE AND OBJECTIVE BOX
Patient is a 61y old  Male who presents with a chief complaint of Hyponatremia (15 Aug 2021 12:54)  Patient seen in follow up for hyponatremia.        PAST MEDICAL HISTORY:  Diabetes    Anemia    Hypothyroidism        MEDICATIONS  (STANDING):  acyclovir Topical 5% Ointment 1 Application(s) Topical three times a day  dextrose 40% Gel 15 Gram(s) Oral once  dextrose 5%. 1000 milliLiter(s) (50 mL/Hr) IV Continuous <Continuous>  dextrose 5%. 1000 milliLiter(s) (100 mL/Hr) IV Continuous <Continuous>  dextrose 50% Injectable 25 Gram(s) IV Push once  dextrose 50% Injectable 12.5 Gram(s) IV Push once  dextrose 50% Injectable 25 Gram(s) IV Push once  enoxaparin Injectable 40 milliGRAM(s) SubCutaneous daily  glucagon  Injectable 1 milliGRAM(s) IntraMuscular once  insulin lispro (ADMELOG) corrective regimen sliding scale   SubCutaneous Before meals and at bedtime  levothyroxine Injectable 50 MICROGram(s) IV Push <User Schedule>  lidocaine   5% Patch 1 Patch Transdermal daily  melatonin 5 milliGRAM(s) Oral at bedtime  sodium chloride 2 Gram(s) Oral every 6 hours    MEDICATIONS  (PRN):  acetaminophen   Tablet .. 650 milliGRAM(s) Oral every 6 hours PRN Mild Pain (1 - 3)  ALBUTerol    90 MICROgram(s) HFA Inhaler 2 Puff(s) Inhalation every 6 hours PRN Shortness of Breath and/or Wheezing    T(C): 36.9 (08-16-21 @ 15:23), Max: 39.1 (08-14-21 @ 19:00)  HR: 75 (08-16-21 @ 12:00) (74 - 91)  BP: 94/55 (08-16-21 @ 12:00) (86/56 - 165/68)  RR: 17 (08-16-21 @ 12:00)  SpO2: 98% (08-16-21 @ 09:00)  Wt(kg): --  I&O's Detail    15 Aug 2021 07:01  -  16 Aug 2021 07:00  --------------------------------------------------------  IN:    Oral Fluid: 130 mL  Total IN: 130 mL    OUT:    Indwelling Catheter - Urethral (mL): 800 mL  Total OUT: 800 mL    Total NET: -670 mL      16 Aug 2021 07:01  -  16 Aug 2021 15:54  --------------------------------------------------------  IN:  Total IN: 0 mL    OUT:    Indwelling Catheter - Urethral (mL): 170 mL  Total OUT: 170 mL    Total NET: -170 mL              PHYSICAL EXAM:  General: No distress  Respiratory: b/l air entry  Cardiovascular: S1 S2  Gastrointestinal: soft  Extremities:  no edema                          LABORATORY:                        10.4   7.34  )-----------( 220      ( 16 Aug 2021 06:54 )             28.8     08-16    125<L>  |  93<L>  |  8   ----------------------------<  118<H>  3.9   |  23  |  0.61    Ca    7.9<L>      16 Aug 2021 06:54  Phos  2.9     08-16  Mg     1.9     08-16    TPro  7.3  /  Alb  3.4  /  TBili  0.9  /  DBili  x   /  AST  160<H>  /  ALT  64  /  AlkPhos  88  08-15    Sodium, Serum: 125 mmol/L (08-16 @ 06:54)  Sodium, Serum: 123 mmol/L (08-15 @ 19:36)  Sodium, Serum: 121 mmol/L (08-15 @ 11:40)  Sodium, Serum: 122 mmol/L (08-15 @ 05:40)    Potassium, Serum: 3.9 mmol/L (08-16 @ 06:54)  Potassium, Serum: 3.8 mmol/L (08-15 @ 19:36)  Potassium, Serum: 4.2 mmol/L (08-15 @ 05:40)  Potassium, Serum: 3.3 mmol/L (08-14 @ 22:12)    Hemoglobin: 10.4 g/dL (08-16 @ 06:54)  Hemoglobin: 12.2 g/dL (08-15 @ 05:40)  Hemoglobin: 11.2 g/dL (08-14 @ 06:18)  Hemoglobin: 10.5 g/dL (08-13 @ 22:15)    Creatinine, Serum 0.61 (08-16 @ 06:54)  Creatinine, Serum 0.65 (08-15 @ 19:36)  Creatinine, Serum 0.76 (08-15 @ 05:40)  Creatinine, Serum 0.72 (08-14 @ 22:12)        LIVER FUNCTIONS - ( 15 Aug 2021 05:40 )  Alb: 3.4 g/dL / Pro: 7.3 g/dL / ALK PHOS: 88 U/L / ALT: 64 U/L / AST: 160 U/L / GGT: x

## 2021-08-16 NOTE — PROGRESS NOTE ADULT - ATTENDING COMMENTS
61M h/o DM2, hypothyroidism a/w encephalopathy in setting of profound hyponatremia c/b rhino/entero virus with new finding of pituitary macroadenoma     Neuro: encephalopathy likely 2/2 hyponatremia, calm when not being touched/moved  - given inappropriately normal TSH, hyponatremia and elevated prolactin concern for possible central cause to his presentation - will do cosyntropin stim test today   - MRI brain with gadolinum pending  CV: hemodynamically stable  Resp: no acute issues  GI: CC diet with fluid restriction  Renal: hyponatremia slowly correcting  - SIADH vs adrenal insufficiency  - continue salt tabs and water restriction  - trend Na q8hr  - continue felix for strict I/O  ID: rhino/enterovirus - supportive care  End: glucose below goal but now taking PO, will monitor     Plan d/w daughter at bedside. Stable for transfer to floor 61M h/o DM2, hypothyroidism a/w encephalopathy in setting of profound hyponatremia c/b rhino/entero virus with new finding of pituitary macroadenoma     Neuro: encephalopathy likely 2/2 hyponatremia, calm when not being touched/moved  - analgesia PRN for low back pain  - given inappropriately normal TSH, hyponatremia and elevated prolactin concern for possible central cause to his presentation - will do cosyntropin stim test today   - MRI brain with gadolinum pending  CV: hemodynamically stable  Resp: no acute issues  GI: CC diet with fluid restriction  Renal: hyponatremia slowly correcting  - SIADH vs adrenal insufficiency  - continue salt tabs and water restriction  - trend Na q8hr  - continue fleix for strict I/O  ID: rhino/enterovirus - supportive care  End: glucose below goal but now taking PO, will monitor     Plan d/w daughter at bedside. Stable for transfer to floor

## 2021-08-16 NOTE — PROGRESS NOTE ADULT - PROBLEM SELECTOR PLAN 3
- CT Head ordered, large pituitary macroadenoma, which may be further assessed and delineated with MR imaging. No acute cerebral infarct, hemorrhage, or collections identified.  - MRI Head ordered, f/u results   - F/u IGF1, ACTG, TSH, Prolactin, Cortisol  - Monitor Na level  - Management as per ICU

## 2021-08-16 NOTE — PROGRESS NOTE ADULT - SUBJECTIVE AND OBJECTIVE BOX
Patient is a 61y old  Male who presents with a chief complaint of Hyponatremia.    24 hour events: Herpetic outbreak over L nare (treated with topical acyclovir). Responsiveness improving but remains confused. Patient complaining of low back pain and BL leg pain.     REVIEW OF SYSTEMS unable to obtain 2/2 metabolic encephalopathy hyponatremia       Vital Signs Last 24 Hrs  T(C): 37.1 (16 Aug 2021 05:00), Max: 37.3 (15 Aug 2021 20:30)  T(F): 98.8 (16 Aug 2021 05:00), Max: 99.2 (15 Aug 2021 20:30)  HR: 86 (16 Aug 2021 09:00) (74 - 91)  BP: 136/97 (16 Aug 2021 09:) (104/50 - 165/68)  BP(mean): 110 (16 Aug 2021 09:) (72 - 110)  RR: 28 (16 Aug 2021 09:) (14 - 44)  SpO2: 98% (16 Aug 2021 09:) (96% - 100%)      PHYSICAL EXAM    General: restless, moving around in bed, responsive but confused.   CNS: A & O x1, confused, nonfocal, moves all extremities   HEENT: NCAT, dry mucous membranes, pupils constricted BL, reactive to light. No JVD. Vesicular lesion noted over L nare.   PULM: Clear to auscultation bilaterally, no significant sputum production  CVS: Regular rate and rhythm, no murmurs, rubs, or gallops  ABD: Soft, nondistended, nontender, normoactive bowel sounds, no masses  BL LE: diffusely TTP, calves soft, L4-S1 SILT, Quad/Hamstring/TA/EHL/FHL/GS motor intact. +DP/PT Pulses.   SKIN: Warm and well perfused.   Tubes lines: left peripheral line 18 gauge +Burgos         125<L>  |  93<L>  |  8   ----------------------------<  118<H>  3.9   |  23  |  0.61    Ca    7.9<L>      16 Aug 2021 06:54  Phos  2.9     -  Mg     1.9     -16    TPro  7.3  /  Alb  3.4  /  TBili  0.9  /  DBili  x   /  AST  160<H>  /  ALT  64  /  AlkPhos  88  08-15                          10.4   7.34  )-----------( 220      ( 16 Aug 2021 06:54 )             28.8           MEDICATIONS      dextrose 40% Gel Oral  dextrose 50% Injectable IV Push  dextrose 50% Injectable IV Push  dextrose 50% Injectable IV Push  glucagon  Injectable IntraMuscular  insulin lispro (ADMELOG) corrective regimen sliding scale SubCutaneous  levothyroxine Oral      enoxaparin Injectable SubCutaneous    Ketorolac IVP Once  Lidocaine 5% Patch    dextrose 5%. IV Continuous  dextrose 5%. IV Continuous  lactated ringers. IV Continuous      Urinalysis Basic - ( 13 Aug 2021 22:15 )    Color: Yellow / Appearance: Clear / S.015 / pH: x  Gluc: x / Ketone: Negative  / Bili: Negative / Urobili: Negative   Blood: x / Protein: Negative / Nitrite: Negative   Leuk Esterase: Negative / RBC: 0-2 /HPF / WBC 0-2   Sq Epi: x / Non Sq Epi: Occasional / Bacteria: Occasional        Rapid RVP Result: Detected ( @ 22:15)  < from: CT Head No Cont (21 @ 16:05) >    FINDINGS:    HEMISPHERES:  This study is degraded by motion. No acute infarct or hemorrhagic lesion is identified.  VENTRICLES:  Midline and normal in size.  POSTERIOR FOSSA:  The brain stem and cerebellum are unremarkable.  No CP angle lesion noted.  EXTRACEREBRAL SPACES:  No subdural or epidural collections are noted.  SKULL BASE AND CALVARIUM:  Appears intact.  No fracture or destructive lesion is identified.  SINUSES AND MASTOIDS:  Clear.  MISCELLANEOUS:  A pituitary macroadenoma is identified on the left, measuring 2.6 cm in height. There is an enlarged, and remodeled pituitary fossa. The lesion is eccentric, more prominent on the left. Cavernous sinus invasion cannot be totally excluded. This may be further assessed with MR imaging.    IMPRESSION:    1)  large pituitary macroadenoma, which may be further assessed and delineated with MR imaging. See above.  2)  no acute cerebral infarct, hemorrhage, or collections identified.    The study is degraded by motion.    --- End of Report ---    < end of copied text >    Radiology: *  EXAM:  XR CHEST PA LAT 2V                            PROCEDURE DATE:  2021          INTERPRETATION:  Admission.    PA lateral.    Normal heart mediastinum. Calcified aortic arch. Question small vague nodular opacity left base. Correlate with chest CT. No acute infiltrate or pleural collection.    IMPRESSION: No acute infiltrates. Question small left basilar nodule. Correlate with chest CT        CENTRAL LINE: no   Burgos: yes ()  a line: No     GLOBAL ISSUE/BEST PRACTICE  Analgesia: n/a   Sedation: n/a   HOB elevation: yes  Stress ulcer prophylaxis:   VTE prophylaxis: lovenox   Glycemic control: 90s   Nutrition: fluid restrict to 1000 cc/24 hrs     CODE STATUS: FULL CODE

## 2021-08-16 NOTE — CONSULT NOTE ADULT - SUBJECTIVE AND OBJECTIVE BOX
Patient is a 61y old  Male who presents with a chief complaint of Hyponatremia (16 Aug 2021 08:09)      Reason For Consult: pituitary macroadenoma    HPI:  60 y/o M with PMHx of T2DM, Iron deficiency anemia, Hypothyroidism presenting with hyponatremia. History as per wife, Mario Rene at bedside as pt is AAOx1-2(Unsure where is is and the year). Wife knows this is not the patient's baseline and believes he is confused/agitated because "he just wants to go home". Per wife, patient has been "feeling unwell" with nausea, poor PO intake, and fatigue for the past 3 days. Pt had visited an urgent care and was prescribed some pepcid and reglan, which provided mild relief. Today, he reportedly had 6 episodes of loose, brown colored non-formed stool. Denies any recent antibiotic use. No recent travel or sick contacts.     ED Course: s/p pepcid 20mg IVP x 1, Zofran 4mg IVP x 1, 1L ns bolus x 1  Vitals: T 98.4, HR 73, /67, O2 98 on RA  Labs: Hb 10.5, MCV 77.7, Na 116, CO2 21, , Ca 7.9, Alb 3.0, Lipase 65, Enterovirus positive  CXR(pre-tello read): Lungs appear clear   (14 Aug 2021 00:42)      PAST MEDICAL & SURGICAL HISTORY:  Diabetes  Type 2    Anemia    Hypothyroidism        FAMILY HISTORY:  No pertinent family history in first degree relatives          Social History:    MEDICATIONS  (STANDING):  acyclovir Topical 5% Ointment 1 Application(s) Topical three times a day  dextrose 40% Gel 15 Gram(s) Oral once  dextrose 5%. 1000 milliLiter(s) (50 mL/Hr) IV Continuous <Continuous>  dextrose 5%. 1000 milliLiter(s) (100 mL/Hr) IV Continuous <Continuous>  dextrose 50% Injectable 25 Gram(s) IV Push once  dextrose 50% Injectable 12.5 Gram(s) IV Push once  dextrose 50% Injectable 25 Gram(s) IV Push once  enoxaparin Injectable 40 milliGRAM(s) SubCutaneous daily  glucagon  Injectable 1 milliGRAM(s) IntraMuscular once  insulin lispro (ADMELOG) corrective regimen sliding scale   SubCutaneous Before meals and at bedtime  levothyroxine Injectable 50 MICROGram(s) IV Push <User Schedule>  melatonin 5 milliGRAM(s) Oral at bedtime    MEDICATIONS  (PRN):  acetaminophen   Tablet .. 650 milliGRAM(s) Oral every 6 hours PRN Mild Pain (1 - 3)  ALBUTerol    90 MICROgram(s) HFA Inhaler 2 Puff(s) Inhalation every 6 hours PRN Shortness of Breath and/or Wheezing        T(C): 37.1 (08-16-21 @ 05:00), Max: 37.8 (08-15-21 @ 12:00)  HR: 79 (08-16-21 @ 07:00) (74 - 91)  BP: 114/58 (08-16-21 @ 07:00) (104/50 - 165/68)  RR: 20 (08-16-21 @ 07:00) (14 - 44)  SpO2: 98% (08-16-21 @ 07:00) (96% - 100%)  Wt(kg): --    PHYSICAL EXAM:  CHEST/LUNG: Clear to percussion bilaterally; No rales, rhonchi, wheezing, or rubs  HEART: Regular rate and rhythm; No murmurs, rubs, or gallops  ABDOMEN: Soft, Nontender, Nondistended; Bowel sounds present  EXTREMITIES:  2+ Peripheral Pulses, No clubbing, cyanosis, or edema  SKIN: No rashes or lesions    CAPILLARY BLOOD GLUCOSE      POCT Blood Glucose.: 119 mg/dL (16 Aug 2021 08:08)  POCT Blood Glucose.: 159 mg/dL (15 Aug 2021 21:03)  POCT Blood Glucose.: 128 mg/dL (15 Aug 2021 16:14)  POCT Blood Glucose.: 136 mg/dL (15 Aug 2021 11:32)                            10.4   7.34  )-----------( 220      ( 16 Aug 2021 06:54 )             28.8       CMP:  08-16 @ 06:54  SGPT --  Albumin --   Alk Phos --   Anion Gap 9   SGOT --   Total Bili --   BUN 8   Calcium Total 7.9   CO2 23   Chloride 93   Creatinine 0.61   eGFR if    eGFR if non    Glucose 118   Potassium 3.9   Protein --   Sodium 125      Thyroid Function Tests:  08-15 @ 13:54 TSH -- FreeT4 -- T3 64 Anti TPO -- Anti Thyroglobulin Ab -- TSI --  08-15 @ 11:40 TSH 1.23 FreeT4 -- T3 -- Anti TPO -- Anti Thyroglobulin Ab -- TSI --      Diabetes Tests:       Radiology:

## 2021-08-17 LAB
ALBUMIN SERPL ELPH-MCNC: 2.9 G/DL — LOW (ref 3.3–5)
ALP SERPL-CCNC: 76 U/L — SIGNIFICANT CHANGE UP (ref 40–120)
ALT FLD-CCNC: 63 U/L — SIGNIFICANT CHANGE UP (ref 12–78)
ANION GAP SERPL CALC-SCNC: 7 MMOL/L — SIGNIFICANT CHANGE UP (ref 5–17)
ANION GAP SERPL CALC-SCNC: 8 MMOL/L — SIGNIFICANT CHANGE UP (ref 5–17)
AST SERPL-CCNC: 88 U/L — HIGH (ref 15–37)
BASOPHILS # BLD AUTO: 0.04 K/UL — SIGNIFICANT CHANGE UP (ref 0–0.2)
BASOPHILS NFR BLD AUTO: 0.6 % — SIGNIFICANT CHANGE UP (ref 0–2)
BILIRUB SERPL-MCNC: 0.5 MG/DL — SIGNIFICANT CHANGE UP (ref 0.2–1.2)
BUN SERPL-MCNC: 8 MG/DL — SIGNIFICANT CHANGE UP (ref 7–23)
BUN SERPL-MCNC: 9 MG/DL — SIGNIFICANT CHANGE UP (ref 7–23)
CALCIUM SERPL-MCNC: 8.1 MG/DL — LOW (ref 8.5–10.1)
CALCIUM SERPL-MCNC: 8.1 MG/DL — LOW (ref 8.5–10.1)
CHLORIDE SERPL-SCNC: 94 MMOL/L — LOW (ref 96–108)
CHLORIDE SERPL-SCNC: 96 MMOL/L — SIGNIFICANT CHANGE UP (ref 96–108)
CO2 SERPL-SCNC: 22 MMOL/L — SIGNIFICANT CHANGE UP (ref 22–31)
CO2 SERPL-SCNC: 24 MMOL/L — SIGNIFICANT CHANGE UP (ref 22–31)
CORTIS PRE/P CHAL SERPL-SCNC: SIGNIFICANT CHANGE UP
CREAT SERPL-MCNC: 0.5 MG/DL — SIGNIFICANT CHANGE UP (ref 0.5–1.3)
CREAT SERPL-MCNC: 0.59 MG/DL — SIGNIFICANT CHANGE UP (ref 0.5–1.3)
EOSINOPHIL # BLD AUTO: 0.52 K/UL — HIGH (ref 0–0.5)
EOSINOPHIL NFR BLD AUTO: 7.2 % — HIGH (ref 0–6)
GLUCOSE SERPL-MCNC: 114 MG/DL — HIGH (ref 70–99)
GLUCOSE SERPL-MCNC: 76 MG/DL — SIGNIFICANT CHANGE UP (ref 70–99)
HCT VFR BLD CALC: 28.9 % — LOW (ref 39–50)
HGB BLD-MCNC: 10.4 G/DL — LOW (ref 13–17)
IMM GRANULOCYTES NFR BLD AUTO: 0.1 % — SIGNIFICANT CHANGE UP (ref 0–1.5)
IRON SATN MFR SERPL: 19 % — SIGNIFICANT CHANGE UP (ref 16–55)
IRON SATN MFR SERPL: 41 UG/DL — LOW (ref 45–165)
LYMPHOCYTES # BLD AUTO: 2.93 K/UL — SIGNIFICANT CHANGE UP (ref 1–3.3)
LYMPHOCYTES # BLD AUTO: 40.3 % — SIGNIFICANT CHANGE UP (ref 13–44)
MAGNESIUM SERPL-MCNC: 2.2 MG/DL — SIGNIFICANT CHANGE UP (ref 1.6–2.6)
MCHC RBC-ENTMCNC: 28.6 PG — SIGNIFICANT CHANGE UP (ref 27–34)
MCHC RBC-ENTMCNC: 36 GM/DL — SIGNIFICANT CHANGE UP (ref 32–36)
MCV RBC AUTO: 79.4 FL — LOW (ref 80–100)
MONOCYTES # BLD AUTO: 0.53 K/UL — SIGNIFICANT CHANGE UP (ref 0–0.9)
MONOCYTES NFR BLD AUTO: 7.3 % — SIGNIFICANT CHANGE UP (ref 2–14)
NEUTROPHILS # BLD AUTO: 3.24 K/UL — SIGNIFICANT CHANGE UP (ref 1.8–7.4)
NEUTROPHILS NFR BLD AUTO: 44.5 % — SIGNIFICANT CHANGE UP (ref 43–77)
NRBC # BLD: 0 /100 WBCS — SIGNIFICANT CHANGE UP (ref 0–0)
PHOSPHATE SERPL-MCNC: 3.1 MG/DL — SIGNIFICANT CHANGE UP (ref 2.5–4.5)
PLATELET # BLD AUTO: 260 K/UL — SIGNIFICANT CHANGE UP (ref 150–400)
POTASSIUM SERPL-MCNC: 3.9 MMOL/L — SIGNIFICANT CHANGE UP (ref 3.5–5.3)
POTASSIUM SERPL-MCNC: 4.1 MMOL/L — SIGNIFICANT CHANGE UP (ref 3.5–5.3)
POTASSIUM SERPL-SCNC: 3.9 MMOL/L — SIGNIFICANT CHANGE UP (ref 3.5–5.3)
POTASSIUM SERPL-SCNC: 4.1 MMOL/L — SIGNIFICANT CHANGE UP (ref 3.5–5.3)
PROT SERPL-MCNC: 6.5 G/DL — SIGNIFICANT CHANGE UP (ref 6–8.3)
RBC # BLD: 3.64 M/UL — LOW (ref 4.2–5.8)
RBC # FLD: 12.9 % — SIGNIFICANT CHANGE UP (ref 10.3–14.5)
SODIUM SERPL-SCNC: 125 MMOL/L — LOW (ref 135–145)
SODIUM SERPL-SCNC: 126 MMOL/L — LOW (ref 135–145)
TIBC SERPL-MCNC: 209 UG/DL — LOW (ref 220–430)
UIBC SERPL-MCNC: 168 UG/DL — SIGNIFICANT CHANGE UP (ref 110–370)
WBC # BLD: 7.27 K/UL — SIGNIFICANT CHANGE UP (ref 3.8–10.5)
WBC # FLD AUTO: 7.27 K/UL — SIGNIFICANT CHANGE UP (ref 3.8–10.5)

## 2021-08-17 PROCEDURE — 99233 SBSQ HOSP IP/OBS HIGH 50: CPT

## 2021-08-17 PROCEDURE — 70553 MRI BRAIN STEM W/O & W/DYE: CPT | Mod: 26

## 2021-08-17 RX ORDER — BENZOCAINE AND MENTHOL 5; 1 G/100ML; G/100ML
1 LIQUID ORAL EVERY 4 HOURS
Refills: 0 | Status: DISCONTINUED | OUTPATIENT
Start: 2021-08-17 | End: 2021-08-18

## 2021-08-17 RX ORDER — LEVOTHYROXINE SODIUM 125 MCG
100 TABLET ORAL DAILY
Refills: 0 | Status: DISCONTINUED | OUTPATIENT
Start: 2021-08-18 | End: 2021-08-18

## 2021-08-17 RX ORDER — SODIUM CHLORIDE 9 MG/ML
2 INJECTION INTRAMUSCULAR; INTRAVENOUS; SUBCUTANEOUS EVERY 6 HOURS
Refills: 0 | Status: DISCONTINUED | OUTPATIENT
Start: 2021-08-17 | End: 2021-08-18

## 2021-08-17 RX ADMIN — SODIUM CHLORIDE 2 GRAM(S): 9 INJECTION INTRAMUSCULAR; INTRAVENOUS; SUBCUTANEOUS at 22:10

## 2021-08-17 RX ADMIN — ENOXAPARIN SODIUM 40 MILLIGRAM(S): 100 INJECTION SUBCUTANEOUS at 12:47

## 2021-08-17 RX ADMIN — SODIUM CHLORIDE 2 GRAM(S): 9 INJECTION INTRAMUSCULAR; INTRAVENOUS; SUBCUTANEOUS at 12:47

## 2021-08-17 RX ADMIN — LIDOCAINE 1 PATCH: 4 CREAM TOPICAL at 12:46

## 2021-08-17 RX ADMIN — SODIUM CHLORIDE 2 GRAM(S): 9 INJECTION INTRAMUSCULAR; INTRAVENOUS; SUBCUTANEOUS at 17:29

## 2021-08-17 RX ADMIN — Medication 650 MILLIGRAM(S): at 02:41

## 2021-08-17 RX ADMIN — LIDOCAINE 1 PATCH: 4 CREAM TOPICAL at 00:00

## 2021-08-17 RX ADMIN — Medication 50 MICROGRAM(S): at 12:53

## 2021-08-17 RX ADMIN — ACYCLOVIR 1 APPLICATION(S): 50 OINTMENT TOPICAL at 14:47

## 2021-08-17 RX ADMIN — Medication 650 MILLIGRAM(S): at 03:00

## 2021-08-17 RX ADMIN — BENZOCAINE AND MENTHOL 1 LOZENGE: 5; 1 LIQUID ORAL at 16:36

## 2021-08-17 RX ADMIN — Medication 20 MILLIGRAM(S): at 12:47

## 2021-08-17 RX ADMIN — LIDOCAINE 1 PATCH: 4 CREAM TOPICAL at 19:30

## 2021-08-17 RX ADMIN — Medication 1: at 16:36

## 2021-08-17 RX ADMIN — ACYCLOVIR 1 APPLICATION(S): 50 OINTMENT TOPICAL at 08:19

## 2021-08-17 RX ADMIN — ACYCLOVIR 1 APPLICATION(S): 50 OINTMENT TOPICAL at 22:12

## 2021-08-17 RX ADMIN — Medication 5 MILLIGRAM(S): at 22:10

## 2021-08-17 RX ADMIN — Medication 1 MILLIGRAM(S): at 10:15

## 2021-08-17 NOTE — PROGRESS NOTE ADULT - TIME BILLING
Patient seen and examined at bedside. Chart, meds, labs, vitals reviewed.
Patient seen and examined at bedside. Chart, meds, labs, vitals reviewed.

## 2021-08-17 NOTE — PROGRESS NOTE ADULT - PROBLEM SELECTOR PLAN 5
Chronic, per wife pt takes iron daily at home  - Hb 10.5 on admission, microcytic  - Continue home Iron 325mg qd  - Will probably need GI work up, colonoscopy   -Stool for OB ordered   -Check iron studies, Ferritin

## 2021-08-17 NOTE — PROGRESS NOTE ADULT - ASSESSMENT
62 y/o M with PMHx of T2DM, Iron deficiency anemia, Hypothyroidism presenting nausea, poor PO intake, and fatigue for the past 3 days. Admitted to the ICU for metabolic encephalopathy likely 2/2 symptomatic hyponatremia in the setting of enterovirus infection, and diarrhea. Found to have a pituitary adenoma.  62 y/o M with PMHx of T2DM, Iron deficiency anemia, Hypothyroidism presenting nausea, poor PO intake, and fatigue for the past 3 days. Admitted to the ICU for metabolic encephalopathy likely 2/2 symptomatic hyponatremia, Found to have a pituitary adenoma and probably panhypopituitarism/ adrenal insufficiency. Was transferred out of ICU last night. Endocrine  Recommended medical management for now, ordered Prednisone and increased dose  of synthroid and recommended out patient Neurosurgical evaluation with Dr. Nj Blevins at [facility] after discharge. Patient was not able to have MRI here, attempted  but was moving too much even  with ativan. Neuro suggested can have it done as out patient after discharge and when he goes for Neurosurgical evaluation, they can perform with conscious sedation. D/c planning once sodium improves with out patient follow up.

## 2021-08-17 NOTE — PHYSICAL THERAPY INITIAL EVALUATION ADULT - HEALTH SCREEN CRITERIA
OFFICE VISIT-COMPLETE PHYSICAL  9/7/2017    ASSESSMENT:  46 year old male.                                1. Annual physical exam    2. Perennial allergic rhinitis    3. Allergic conjunctivitis, bilateral    4. Mixed hyperlipidemia    5. Obstructive sleep apnea syndrome    6. Family history of malignant neoplasm of prostate    7. Screening for diabetes mellitus (DM)    8. History of repair of hiatal hernia        PLAN:  (Z00.00) Annual physical exam  (primary encounter diagnosis)  Health maintenance topics were reviewed in detail as noted below.    (J30.89) Perennial allergic rhinitis  Plan: montelukast (SINGULAIR) 10 MG tablet  Allergic rhinitis symptoms adequately controlled at present with montelukast which will be refilled today for the year.  Patient will continue with loratadine and Rhinocort over the counter.    (H10.13) Allergic conjunctivitis, bilateral  Plan: cromolyn (OPTICROM) 4 % ophthalmic solution  Opticrom refilled at patient request to treat allergic rhinitis symptoms.    (E78.2) Mixed hyperlipidemia  Plan: COMPREHENSIVE METABOLIC PANEL, LIPID PANEL WITH        REFLEX  10 yr ASCVD risk noted at 4.2%, so no pharmacologic treatment.  However with the rise in lipids I strongly urged patient to resume his more rigorous exercise schedule and to work more stringently on reduction of carbohydrates and weight loss.    (G47.33) Obstructive sleep apnea syndrome  Continue with CPAP per Dr. Ashby.    (Z80.42) Family history of malignant neoplasm of prostate  Initial PSA test is well in the normal range.  I am not recommending retesting this until patient is age 48.    (Z13.1) Screening for diabetes mellitus (DM)  Plan: COMPREHENSIVE METABOLIC PANEL    (Z98.890,  Z87.19) History of repair of hiatal hernia     ________________________________________________________________________    Rooming staff notes reviewed and agree    HISTORY OF PRESENT ILLNESS:  Cristhian Hickey presents for his routine physical exam.            YES  NO  [x]  []  Wears seatbelt  [x]  []  Uses sunscreen  [x]  []  Operational smoke detectors in home  [x]  []  Operational CO detectors in home  [x]  []  Has a living Will  []  [x]  Unlocked guns in home  []  [x]  Is a victim of spousal abuse  []  [x]  Is a victim of family violence  []  [x]  Smoking exposure  []  [x]  Home radon checked    Most Recent PHQ 2/9 Score     Date               - 2017     PHQ2 Score           - 1         PHQ9 Total Score  - 2   indicatin-4 community norm - no further tx.    Most Recent GAD7 score:  2 (2017)   indicatin-7 Significant anxiety unlikely.      HEALTH CARE MAINTENANCE:    Cholesterol - results of most recent cholesterol panel today.  Patient will work on diet and exercise to control lipids at this time.  10 yr ASCVD risk at 4.2%.    Screening for prostate cancer discussed.      PSA, Total (ng/mL)   Date Value   2017 0.70       Last Eye Exam - 1 year ago.    Diet/Exercise - Discussed and patient has been away from some of his better exercise habits and eating habits and is going to reconsider this and try to both lose weight and get a more rigorous exercise schedule for the upcoming year.    Tobacco/Alcohol use - Discussed and patient is a nonsmoker and alcohol intake is on the order of 2 drinks a day on average.    Colon cancer screening - Discussed and patient has no family history of colon cancer and is not yet due for screening.    Immunizations - Discussed and no immunizations are due at this time.    Caffeine - Yes    No family history of colon cancer.            Problems and Stated Complaints addressed today:    1. Annual physical exam:  Annual exams completed today.  Health maintenance topics reviewed in detail as noted above.       2. Perennial allergic rhinitis:  Patient with year-round allergic symptoms and peak symptoms noted in the spring and fall months.  He is right now having significant problems but notes fairly good control with  current medications including montelukast 10 mg nightly, loratadine 10 mg daily, and Rhinocort nasal spray.  Patient continues to get 2-3 sinus infections a year despite improved control of allergies over recent years.  No change in treatment at present.       3. Allergic conjunctivitis, bilateral:  Patient with irritation affecting his eyes associated with allergic symptoms in the spring and fall primarily.  He has had good success in treating this with cromolyn sodium drops and is requesting a refill on this prescription since he will soon be out.       4. Mixed hyperlipidemia:  Patient with rise in lipid values over last year.  The total cholesterol is increased to 253, , HDL 48, and triglycerides 228.  While this is a significant rise, the 10 yr ASCVD risk is only 4.2% largely based on his age, and I would not recommend a statin medication based on this.  I am recommending more stringent adherence to diet and regular exercise in order to once again lower lipid values as he has been able to do in the past.      5. Obstructive sleep apnea syndrome:  Patient with obstructive sleep apnea using CPAP.  This is monitored by Dr. Ashby.  Patient uses the equipment regularly.       6. Family history of malignant neoplasm of prostate:  Patient's father diagnosed in his 60s with prostate cancer.  Patient did have a PSA done early this year as an early indicator which is well in the normal range noted at 0.70.  I would not recommend retesting PSA test again until age 48 based on this finding.       7. Screening for diabetes mellitus (DM)      8. History of repair of hiatal hernia          Patient Active Problem List    Diagnosis Date Noted   • Family history of malignant neoplasm of prostate 08/09/2016     Priority: Low   • Obstructive sleep apnea syndrome 08/09/2016     Priority: Low     Managed by Dr. Ashby; CPAP used regularly     • Perennial allergic rhinitis 06/07/2016     Priority: Low     Symptoms somewhat less  in the winter months.     • Allergic conjunctivitis 06/07/2016     Priority: Low   • Mixed hyperlipidemia 01/15/2016     Priority: Low     Managed with lifestyle changes.         ALLERGIES:   Allergen Reactions   • Erythromycin HIVES   • Seasonal HEADACHES   • Sulfa Antibiotics Other (See Comments)       Current Outpatient Prescriptions   Medication Sig Dispense Refill   • cromolyn (OPTICROM) 4 % ophthalmic solution Place 2 drops into both eyes 4 times daily. 30 mL 3   • montelukast (SINGULAIR) 10 MG tablet Take 1 tablet by mouth nightly. 90 tablet 3   • budesonide (RINOCORT AQUA) 32 MCG/ACT nasal spray Spray 1 spray in each nostril daily.     • loratadine (CLARITIN) 10 MG tablet Take 10 mg by mouth daily.       No current facility-administered medications for this visit.        Past Medical History:   Diagnosis Date   • Allergy    • Gastro-esophageal reflux          HERNIA REPAIR                                                   Comment: Right inguinal and ventral    NEPHRECTOMY                                     2005            Comment: Kidney donation    TONSILLECTOMY AND ADENOIDECTOMY                               Social History     Social History   • Marital status:      Spouse name: N/A   • Number of children: N/A   • Years of education: N/A     Occupational History   • Not on file.     Social History Main Topics   • Smoking status: Never Smoker   • Smokeless tobacco: Never Used   • Alcohol use 1.2 oz/week     2 Standard drinks or equivalent per week   • Drug use: No   • Sexual activity: Yes     Partners: Female     Other Topics Concern   • Not on file     Social History Narrative   • No narrative on file         REVIEW OF SYSTEMS:  Constitutional:  Denies fever or chills.  Weight stable.   Eyes:  Denies change in visual acuity, diplopia.   HENT:  Denies nasal congestion, sore throat, change of hearing.   Respiratory:  Denies cough or shortness of breath.   Cardiovascular:  Denies chest pain, edema,  palpitations, orthopnea.     Gastrointestinal:  Denies abdominal pain, nausea, vomiting, bloody stools or diarrhea.   Genitourinary:  Denies dysuria, frequency, urgency.   Musculoskeletal:  Denies back pain, joint pain or joint swelling.   Integument:  Denies rash, changing skin lesions.   Neurologic:  Denies headache, focal weakness or sensory changes.   Endocrine:  Denies polyuria or polydipsia.  No flushing.   Lymphatic:  Denies swollen glands.   Psychiatric:  Denies depression or anxiety.       PHYSICAL EXAM:  VITAL SIGNS:   Visit Vitals  /82   Pulse 68   Resp 16   Ht 5' 10\" (1.778 m)   Wt 95.7 kg   BMI 30.28 kg/m²     Constitutional:  Well developed, well nourished, no acute distress, non-toxic appearance.   Eyes:  PERRL (Pupils equal, round, reactive to light), conjunctivae normal, no scleral icterus or conjunctival pallor.  HENT:  Atraumatic, auditory acuity grossly intact.  TMs (Tympanic membranes) and canals normal.  Nose normal, oropharynx moist, no pharyngeal exudates.  Dentition well repaired.  Neck:  Normal range of motion, no tenderness, supple.   Respiratory:  No respiratory distress, normal breath sounds, no rales, no wheezing.   Cardiovascular:  Normal rate, normal rhythm, no murmurs, no gallops, no rubs.    Peripheral pulses intact and symmetrical in upper and lower extremities.  No pedal edema.  Gastrointestinal:  Soft, nondistended, normal bowel sounds, nontender, no organomegaly, no mass, no rebound, no guarding.   Genitourinary:  No costovertebral angle tenderness.  Genitalia/Rectal Exam:  Normal external male genitalia without lesions.  Testes descended bilaterally with fullness noted of the right testicle suggestive of hydrocele.  Left testicle is of normal size. No inguinal adenopathy. and Self exam reviewed.  Examination for a little hernia reveals no evidence of hernia on the left, however on the right there is a roughly 4 cm bulge noted at the internal inguinal ring area.  Patient  has had prior hernia repair at the site, and it is unclear if this is simply bulging of the mesh or a recurrence of herniation, however there is no incarceration noted in the bulge reduces even with the patient standing.  The right inguinal area is nontender with palpation.  Musculoskeletal:  No joint tenderness, swelling, nor deformities noted.  Back is nontender and shows normal alignment.  Integument:  Well hydrated, no rash.   Lymphatic:  No cervical nor inguinal adenopathy noted.   Neurologic:  Alert and oriented x3, CN (cranial nerves) 2-12 normal, normal motor function, normal sensory function, no focal deficits noted.  No apraxia or ataxia noted.   Psychiatric:  Speech and behavior appropriate.      yes

## 2021-08-17 NOTE — PROGRESS NOTE ADULT - SUBJECTIVE AND OBJECTIVE BOX
Neurology follow up note    SAMANTHA FISHER61yMale      Interval History:    Patient feels ok no new complaints.    Allergies    No Known Allergies    Intolerances        MEDICATIONS    acetaminophen   Tablet .. 650 milliGRAM(s) Oral every 6 hours PRN  acyclovir Topical 5% Ointment 1 Application(s) Topical three times a day  ALBUTerol    90 MICROgram(s) HFA Inhaler 2 Puff(s) Inhalation every 6 hours PRN  dextrose 40% Gel 15 Gram(s) Oral once  dextrose 5%. 1000 milliLiter(s) IV Continuous <Continuous>  dextrose 5%. 1000 milliLiter(s) IV Continuous <Continuous>  dextrose 50% Injectable 25 Gram(s) IV Push once  dextrose 50% Injectable 12.5 Gram(s) IV Push once  dextrose 50% Injectable 25 Gram(s) IV Push once  enoxaparin Injectable 40 milliGRAM(s) SubCutaneous daily  glucagon  Injectable 1 milliGRAM(s) IntraMuscular once  insulin lispro (ADMELOG) corrective regimen sliding scale   SubCutaneous Before meals and at bedtime  levothyroxine Injectable 50 MICROGram(s) IV Push <User Schedule>  lidocaine   5% Patch 1 Patch Transdermal daily  melatonin 5 milliGRAM(s) Oral at bedtime  predniSONE   Tablet 20 milliGRAM(s) Oral daily              Vital Signs Last 24 Hrs  T(C): 36.8 (17 Aug 2021 05:28), Max: 36.9 (16 Aug 2021 15:23)  T(F): 98.3 (17 Aug 2021 05:28), Max: 98.5 (16 Aug 2021 15:23)  HR: 68 (17 Aug 2021 05:28) (66 - 85)  BP: 113/54 (17 Aug 2021 05:28) (92/49 - 143/65)  BP(mean): 74 (17 Aug 2021 03:00) (67 - 94)  RR: 16 (17 Aug 2021 05:28) (14 - 38)  SpO2: 97% (17 Aug 2021 05:28) (94% - 100%)      REVIEW OF SYSTEMS:  Constitutional:  Positive history of fevers.  Head:  No headaches.  Eyes:  Positive history of poor vision out of the right eye.  Ears:  No ringing in the ears.  Neck:  No neck pain.  Respiratory:  No shortness of breath.  Cardiovascular:  No chest pain.  Abdomen:  Positive history of nausea, vomiting, and diarrhea.  Musculoskeletal:  Positive history of spasms.  Extremities/Neurological:  Possibly underlying neuropathy.  Genitourinary:  No burning upon urination.  All information was obtained from spouse and from the patient.    PHYSICAL EXAMINATION:    HEENT:  Head:  Normocephalic and atraumatic.  Eyes:  No scleral icterus.  Ears:  Hearing bilaterally intact.  NECK:  Supple.  RESPIRATORY:  Decreased breath sounds bilaterally.  CARDIOVASCULAR:  S1 and S2 heard.  ABDOMEN:  Soft and nontender.  EXTREMITIES:  No clubbing or cyanosis were noted.      NEUROLOGIC:  The patient was awake in bed.  Location was hospital.  Extraocular movements were intact, does have poor vision out of the right eye, blurry vision to begin with, not new.  Speech was fluent.  Smile symmetric.  Motor:  Bilateral upper 4/5, bilateral lower 3-/5.  Sensory:  Bilateral upper and lower intact to light touch.               LABS:  CBC Full  -  ( 17 Aug 2021 07:54 )  WBC Count : 7.27 K/uL  RBC Count : 3.64 M/uL  Hemoglobin : 10.4 g/dL  Hematocrit : 28.9 %  Platelet Count - Automated : 260 K/uL  Mean Cell Volume : 79.4 fl  Mean Cell Hemoglobin : 28.6 pg  Mean Cell Hemoglobin Concentration : 36.0 gm/dL  Auto Neutrophil # : 3.24 K/uL  Auto Lymphocyte # : 2.93 K/uL  Auto Monocyte # : 0.53 K/uL  Auto Eosinophil # : 0.52 K/uL  Auto Basophil # : 0.04 K/uL  Auto Neutrophil % : 44.5 %  Auto Lymphocyte % : 40.3 %  Auto Monocyte % : 7.3 %  Auto Eosinophil % : 7.2 %  Auto Basophil % : 0.6 %      08-17    126<L>  |  96  |  9   ----------------------------<  76  3.9   |  22  |  0.50    Ca    8.1<L>      17 Aug 2021 07:54  Phos  3.1     08-17  Mg     2.2     08-17    TPro  6.5  /  Alb  2.9<L>  /  TBili  0.5  /  DBili  x   /  AST  88<H>  /  ALT  63  /  AlkPhos  76  08-17    Hemoglobin A1C:     LIVER FUNCTIONS - ( 17 Aug 2021 07:54 )  Alb: 2.9 g/dL / Pro: 6.5 g/dL / ALK PHOS: 76 U/L / ALT: 63 U/L / AST: 88 U/L / GGT: x           Vitamin B12         RADIOLOGY    ANALYSIS AND PLAN:  This is a 61-year-old with altered mental status and pituitary macroadenoma.  For episode of altered mental status, suspect this is metabolic encephalopathy which is multifactorial secondary to underlying septic type process along with hyponatremia.  Would recommend antibiotics as needed.  Infection workup as needed.  For hyponatremia, we will correct sodium by 10 millimoles a day to help prevent central pontine myelinolysis.  For history of pituitary macroadenoma, per my conversation with spouse, the patient did see an ophthalmologist recently, had peripheral field testing done which showed no abnormalities.  We will plan for MRI imaging of the brain.  Would recommend endocrinology followup as needed  For history of diabetes, strict control of blood sugars.  For neuropathy, strict control of blood sugars, as necessary can try gabapentin based on the patient's pain.  Spoke with spouse, Dora, at bedside.  Her telephone number is [phone]. 8/16/21  LBP will try lidoderm patch   mental status better         Greater than 45 minutes of time was spent with the patient, plan of care, reviewing data, with greater than 50% of the visit was spent counseling and/or coordinating care with multidisciplinary healthcare team

## 2021-08-17 NOTE — PROGRESS NOTE ADULT - PROBLEM SELECTOR PLAN 2
cont fs bg monitoring  anticipate pre-meal standing admelog with inititiation of glucocorticoids  goal bg 100-180 in hosp setting  cont cons cho diet

## 2021-08-17 NOTE — PROGRESS NOTE ADULT - PROBLEM SELECTOR PLAN 1
- Na 116 on admission, symptomatic hyponatremia likey SIADH   - Enterovirus/ Rhino Virus  positive on admission. Symptomatic management   - Na improving   - s/p 1L ns bolus x 1 on admission, s/p LR, s/p DDAVP   - S/p NaCl tablets q6h x2 doses   - Nephro following   - Fluid restriction to 1000 cc/24 hrs  - Nephro Dr. Zarate following   - Monitor Na level - Likely due to adrenal insufficiency, Cosyntropin test result + / panhypopituitarism, CT Head showed large pituitary macroadenoma.   -D/w Dr. Perlman Craig from [facility]. Recommended medical management for now, ordered Prednisone and increased dose  of synthroid and out patient Neurosurgical evaluation with Dr. Nj Blevins at [facility].     ICU Course:   -Initially thought due to SIADH   - s/p 1L ns bolus x 1 on admission, s/p LR, s/p DDAVP   - S/p NaCl tablets q6h x2 doses   - Fluid restriction to 1000 cc/24 hrs  - Nephro Dr. Zarate following   - Monitor Na level

## 2021-08-17 NOTE — PROGRESS NOTE ADULT - PROBLEM SELECTOR PLAN 6
Chronic, stable  - Takes metformin bid at home, hold all oral anti-diabetic agents  - ldss, accu-checks, hypoglycemia protocol  - HbA1c 6.9

## 2021-08-17 NOTE — PROGRESS NOTE ADULT - SUBJECTIVE AND OBJECTIVE BOX
Patient is a 61y old  Male who presents with a chief complaint of Hyponatremia (16 Aug 2021 15:51)       INTERVAL HPI/OVERNIGHT EVENTS: Seen and examined. No overnight events. Transferred from ICU last night. Confused, sleepy but responsive     MEDICATIONS  (STANDING):  acyclovir Topical 5% Ointment 1 Application(s) Topical three times a day  dextrose 40% Gel 15 Gram(s) Oral once  dextrose 5%. 1000 milliLiter(s) (50 mL/Hr) IV Continuous <Continuous>  dextrose 5%. 1000 milliLiter(s) (100 mL/Hr) IV Continuous <Continuous>  dextrose 50% Injectable 25 Gram(s) IV Push once  dextrose 50% Injectable 12.5 Gram(s) IV Push once  dextrose 50% Injectable 25 Gram(s) IV Push once  enoxaparin Injectable 40 milliGRAM(s) SubCutaneous daily  glucagon  Injectable 1 milliGRAM(s) IntraMuscular once  insulin lispro (ADMELOG) corrective regimen sliding scale   SubCutaneous Before meals and at bedtime  levothyroxine Injectable 50 MICROGram(s) IV Push <User Schedule>  lidocaine   5% Patch 1 Patch Transdermal daily  LORazepam   Injectable 1 milliGRAM(s) IV Push once  melatonin 5 milliGRAM(s) Oral at bedtime    MEDICATIONS  (PRN):  acetaminophen   Tablet .. 650 milliGRAM(s) Oral every 6 hours PRN Mild Pain (1 - 3)  ALBUTerol    90 MICROgram(s) HFA Inhaler 2 Puff(s) Inhalation every 6 hours PRN Shortness of Breath and/or Wheezing      Allergies    No Known Allergies    Intolerances        REVIEW OF SYSTEMS:  Unable to obtain, patient is confused, wont answer   Vital Signs Last 24 Hrs  T(C): 36.8 (17 Aug 2021 05:28), Max: 36.9 (16 Aug 2021 15:23)  T(F): 98.3 (17 Aug 2021 05:28), Max: 98.5 (16 Aug 2021 15:23)  HR: 68 (17 Aug 2021 05:28) (66 - 86)  BP: 113/54 (17 Aug 2021 05:28) (92/49 - 143/65)  BP(mean): 74 (17 Aug 2021 03:00) (67 - 110)  RR: 16 (17 Aug 2021 05:28) (14 - 38)  SpO2: 97% (17 Aug 2021 05:28) (94% - 100%)    PHYSICAL EXAM:    GENERAL: NAD, sleepy but responsive   HEAD:  Atraumatic, Normocephalic  EYES: EOMI, PERRLA, conjunctiva and sclera clear  ENMT: No tonsillar erythema, exudates, or enlargement  NECK: Supple, No JVD, Normal thyroid  CHEST/LUNG: Clear to auscultation bilaterally; No  wheezing  HEART: S1S2+,  Regular rate and rhythm  ABDOMEN: Soft, Nontender, Nondistended; Bowel sounds present  EXTREMITIES:  2+ Peripheral Pulses, No clubbing, cyanosis  LYMPH: No lymphadenopathy noted  SKIN: No rashes, WARM, DRY           LABS:    16 Aug 2021 23:53    125    |  94     |  8      ----------------------------<  114    4.1     |  24     |  0.59     Ca    8.1        16 Aug 2021 23:53        CAPILLARY BLOOD GLUCOSE      POCT Blood Glucose.: 79 mg/dL (17 Aug 2021 08:03)  POCT Blood Glucose.: 173 mg/dL (16 Aug 2021 21:32)  POCT Blood Glucose.: 185 mg/dL (16 Aug 2021 17:11)  POCT Blood Glucose.: 113 mg/dL (16 Aug 2021 11:38)  POCT Blood Glucose.: 119 mg/dL (16 Aug 2021 08:08)    BLOOD CULTURE    RADIOLOGY & ADDITIONAL TESTS:    Imaging Personally Reviewed:  [ ] YES     Consultant(s) Notes Reviewed:      Care Discussed with Consultants/Other Providers:

## 2021-08-17 NOTE — PROGRESS NOTE ADULT - SUBJECTIVE AND OBJECTIVE BOX
Patient is a 61y old  Male who presents with a chief complaint of Hyponatremia (15 Aug 2021 12:54)  Patient seen in follow up for hyponatremia.        PAST MEDICAL HISTORY:  Diabetes    Anemia    Hypothyroidism        MEDICATIONS  (STANDING):  acyclovir Topical 5% Ointment 1 Application(s) Topical three times a day  dextrose 40% Gel 15 Gram(s) Oral once  dextrose 5%. 1000 milliLiter(s) (50 mL/Hr) IV Continuous <Continuous>  dextrose 5%. 1000 milliLiter(s) (100 mL/Hr) IV Continuous <Continuous>  dextrose 50% Injectable 25 Gram(s) IV Push once  dextrose 50% Injectable 12.5 Gram(s) IV Push once  dextrose 50% Injectable 25 Gram(s) IV Push once  enoxaparin Injectable 40 milliGRAM(s) SubCutaneous daily  glucagon  Injectable 1 milliGRAM(s) IntraMuscular once  insulin lispro (ADMELOG) corrective regimen sliding scale   SubCutaneous Before meals and at bedtime  levothyroxine Injectable 50 MICROGram(s) IV Push <User Schedule>  lidocaine   5% Patch 1 Patch Transdermal daily  melatonin 5 milliGRAM(s) Oral at bedtime  predniSONE   Tablet 20 milliGRAM(s) Oral daily  sodium chloride 2 Gram(s) Oral every 6 hours    MEDICATIONS  (PRN):  acetaminophen   Tablet .. 650 milliGRAM(s) Oral every 6 hours PRN Mild Pain (1 - 3)  ALBUTerol    90 MICROgram(s) HFA Inhaler 2 Puff(s) Inhalation every 6 hours PRN Shortness of Breath and/or Wheezing    T(C): 36.8 (08-17-21 @ 05:28), Max: 37.3 (08-15-21 @ 20:30)  HR: 68 (08-17-21 @ 05:28) (66 - 91)  BP: 113/54 (08-17-21 @ 05:28) (92/49 - 165/68)  RR: 16 (08-17-21 @ 05:28)  SpO2: 97% (08-17-21 @ 05:28)  Wt(kg): --  I&O's Detail    16 Aug 2021 07:01  -  17 Aug 2021 07:00  --------------------------------------------------------  IN:  Total IN: 0 mL    OUT:    Indwelling Catheter - Urethral (mL): 770 mL  Total OUT: 770 mL    Total NET: -770 mL                PHYSICAL EXAM:  General: No distress  Respiratory: b/l air entry  Cardiovascular: S1 S2  Gastrointestinal: soft  Extremities:  no edema                          LABORATORY:                        10.4   7.27  )-----------( 260      ( 17 Aug 2021 07:54 )             28.9     08-17    126<L>  |  96  |  9   ----------------------------<  76  3.9   |  22  |  0.50    Ca    8.1<L>      17 Aug 2021 07:54  Phos  3.1     08-17  Mg     2.2     08-17    TPro  6.5  /  Alb  2.9<L>  /  TBili  0.5  /  DBili  x   /  AST  88<H>  /  ALT  63  /  AlkPhos  76  08-17    Sodium, Serum: 126 mmol/L (08-17 @ 07:54)  Sodium, Serum: 125 mmol/L (08-16 @ 23:53)  Sodium, Serum: 124 mmol/L (08-16 @ 16:18)  Sodium, Serum: 125 mmol/L (08-16 @ 06:54)    Potassium, Serum: 3.9 mmol/L (08-17 @ 07:54)  Potassium, Serum: 4.1 mmol/L (08-16 @ 23:53)  Potassium, Serum: 4.0 mmol/L (08-16 @ 16:18)  Potassium, Serum: 3.9 mmol/L (08-16 @ 06:54)    Hemoglobin: 10.4 g/dL (08-17 @ 07:54)  Hemoglobin: 10.4 g/dL (08-16 @ 06:54)  Hemoglobin: 12.2 g/dL (08-15 @ 05:40)    Creatinine, Serum 0.50 (08-17 @ 07:54)  Creatinine, Serum 0.59 (08-16 @ 23:53)  Creatinine, Serum 0.70 (08-16 @ 16:18)  Creatinine, Serum 0.61 (08-16 @ 06:54)        LIVER FUNCTIONS - ( 17 Aug 2021 07:54 )  Alb: 2.9 g/dL / Pro: 6.5 g/dL / ALK PHOS: 76 U/L / ALT: 63 U/L / AST: 88 U/L / GGT: x

## 2021-08-17 NOTE — PHARMACOTHERAPY INTERVENTION NOTE - COMMENTS
Pt is tolerating po meds since 8/16. Contacted Dr. Kelley and recommended IV to PO. Accepted. Ordered Synthroid  mcg (increased from 75 mcg) to resume 8/18/21 AM. 62 y/o male w/ PMHx of hypothyroidism, anemia, and diabetes. Pt has been on IV Synthroid 2/2 AMS since admission. Pt is tolerating PO diet and meds since 8/15. Contacted Dr. Kelley and recommended IV Synthroid to PO. Accepted. Ordered Synthroid  mcg daily (increased from 75 mcg) to resume 8/18/21 AM.

## 2021-08-17 NOTE — PROGRESS NOTE ADULT - SUBJECTIVE AND OBJECTIVE BOX
Patient is a 61y old  Male who presents with a chief complaint of Hyponatremia (17 Aug 2021 08:04)        INTERVAL HPI/OVERNIGHT EVENTS:    MEDICATIONS  (STANDING):  acyclovir Topical 5% Ointment 1 Application(s) Topical three times a day  dextrose 40% Gel 15 Gram(s) Oral once  dextrose 5%. 1000 milliLiter(s) (50 mL/Hr) IV Continuous <Continuous>  dextrose 5%. 1000 milliLiter(s) (100 mL/Hr) IV Continuous <Continuous>  dextrose 50% Injectable 25 Gram(s) IV Push once  dextrose 50% Injectable 12.5 Gram(s) IV Push once  dextrose 50% Injectable 25 Gram(s) IV Push once  enoxaparin Injectable 40 milliGRAM(s) SubCutaneous daily  glucagon  Injectable 1 milliGRAM(s) IntraMuscular once  insulin lispro (ADMELOG) corrective regimen sliding scale   SubCutaneous Before meals and at bedtime  levothyroxine Injectable 50 MICROGram(s) IV Push <User Schedule>  lidocaine   5% Patch 1 Patch Transdermal daily  LORazepam   Injectable 1 milliGRAM(s) IV Push once  melatonin 5 milliGRAM(s) Oral at bedtime    MEDICATIONS  (PRN):  acetaminophen   Tablet .. 650 milliGRAM(s) Oral every 6 hours PRN Mild Pain (1 - 3)  ALBUTerol    90 MICROgram(s) HFA Inhaler 2 Puff(s) Inhalation every 6 hours PRN Shortness of Breath and/or Wheezing      Allergies    No Known Allergies    Intolerances          Vital Signs Last 24 Hrs  T(C): 36.8 (17 Aug 2021 05:28), Max: 36.9 (16 Aug 2021 15:23)  T(F): 98.3 (17 Aug 2021 05:28), Max: 98.5 (16 Aug 2021 15:23)  HR: 68 (17 Aug 2021 05:28) (66 - 85)  BP: 113/54 (17 Aug 2021 05:28) (92/49 - 143/65)  BP(mean): 74 (17 Aug 2021 03:00) (67 - 94)  RR: 16 (17 Aug 2021 05:28) (14 - 38)  SpO2: 97% (17 Aug 2021 05:28) (94% - 100%)    General: WN/WD NAD  Respiratory: CTA B/L  CV: RRR, S1S2, no murmurs, rubs or gallops  Abdominal: Soft, NT, ND +BS, Last BM  Extremities: No edema, + peripheral pulses    LABS:                        10.4   7.27  )-----------( 260      ( 17 Aug 2021 07:54 )             28.9     08-17    126<L>  |  96  |  9   ----------------------------<  76  3.9   |  22  |  0.50    Ca    8.1<L>      17 Aug 2021 07:54  Phos  3.1     08-17  Mg     2.2     08-17    TPro  6.5  /  Alb  2.9<L>  /  TBili  0.5  /  DBili  x   /  AST  88<H>  /  ALT  63  /  AlkPhos  76  08-17    CAPILLARY BLOOD GLUCOSE      POCT Blood Glucose.: 79 mg/dL (17 Aug 2021 08:03)  POCT Blood Glucose.: 173 mg/dL (16 Aug 2021 21:32)  POCT Blood Glucose.: 185 mg/dL (16 Aug 2021 17:11)  POCT Blood Glucose.: 113 mg/dL (16 Aug 2021 11:38)          RADIOLOGY & ADDITIONAL TESTS:

## 2021-08-17 NOTE — PROGRESS NOTE ADULT - PROBLEM SELECTOR PLAN 2
- likely 2/2 hyponatremia  - CT Head showed large pituitary macroadenoma, which may be further assessed and delineated with MR imaging. No acute cerebral infarct, hemorrhage, or collections identified.  - MRI Head ordered, f/u results   - Neuro f/u appreciated

## 2021-08-17 NOTE — PROGRESS NOTE ADULT - PROBLEM SELECTOR PLAN 8
DVT PPX: Lovenox
DVT PPX: Lovenox  - Management as per ICU
DVT PPX: Lovenox  - Management as per ICU

## 2021-08-17 NOTE — PROGRESS NOTE ADULT - ASSESSMENT
Hyponatremia: SIADH, Adrenal insufficiency  Hypertension  Pituitary macroadenoma  Change in mental status  Diabetes    Started on meds for adrenal insufficiency. Improving sodium levels. PO fluid restriction. Salt tabs as ordered. Monitor BP trend. Titrate BP meds as needed.   Monitor blood sugar levels. Insulin coverage as needed. Endocrine follow up. D/w family at bedside. D/w ICU team.

## 2021-08-17 NOTE — PROGRESS NOTE ADULT - PROBLEM SELECTOR PLAN 3
- CT Head ordered, large pituitary macroadenoma, which may be further assessed and delineated with MR imaging. No acute cerebral infarct, hemorrhage, or collections identified.  - MRI Head ordered, f/u results   - F/u IGF1, ACTG, TSH, Prolactin, Cortisol  - Endocrine following as well - CT Head ordered, large pituitary macroadenoma, which may be further assessed and delineated with MR imaging. No acute cerebral infarct, hemorrhage, or collections identified.  - MRI Head ordered, but patient not able to sit still for 45 minutes to an hour for the test. D/w Neuro, recommended can do outpatient with conscious sedation when he goes for Neurosurgical evaluation as out patient    - Endocrine following as well

## 2021-08-17 NOTE — PROGRESS NOTE ADULT - PROBLEM SELECTOR PLAN 1
cosyntropin stim test c/w adrenal insuff  central hypothyroidism  low igf-1 c/w GH def  mildly elev prolactin due to pituitary stalk deviation/mass effect  hyponatremia potentially due to adrenal insuff/ central hypothyroidism  start prednisone 20mg daily  increase lt4 100mcg/day  pt to require neurogurgical evaluation

## 2021-08-18 ENCOUNTER — TRANSCRIPTION ENCOUNTER (OUTPATIENT)
Age: 61
End: 2021-08-18

## 2021-08-18 VITALS
DIASTOLIC BLOOD PRESSURE: 82 MMHG | SYSTOLIC BLOOD PRESSURE: 125 MMHG | TEMPERATURE: 98 F | HEART RATE: 75 BPM | OXYGEN SATURATION: 98 % | RESPIRATION RATE: 18 BRPM

## 2021-08-18 LAB
ALBUMIN SERPL ELPH-MCNC: 3.2 G/DL — LOW (ref 3.3–5)
ALP SERPL-CCNC: 84 U/L — SIGNIFICANT CHANGE UP (ref 40–120)
ALT FLD-CCNC: 64 U/L — SIGNIFICANT CHANGE UP (ref 12–78)
ANION GAP SERPL CALC-SCNC: 6 MMOL/L — SIGNIFICANT CHANGE UP (ref 5–17)
AST SERPL-CCNC: 56 U/L — HIGH (ref 15–37)
BASOPHILS # BLD AUTO: 0.02 K/UL — SIGNIFICANT CHANGE UP (ref 0–0.2)
BASOPHILS NFR BLD AUTO: 0.3 % — SIGNIFICANT CHANGE UP (ref 0–2)
BILIRUB SERPL-MCNC: 0.4 MG/DL — SIGNIFICANT CHANGE UP (ref 0.2–1.2)
BUN SERPL-MCNC: 10 MG/DL — SIGNIFICANT CHANGE UP (ref 7–23)
CALCIUM SERPL-MCNC: 8.7 MG/DL — SIGNIFICANT CHANGE UP (ref 8.5–10.1)
CHLORIDE SERPL-SCNC: 105 MMOL/L — SIGNIFICANT CHANGE UP (ref 96–108)
CO2 SERPL-SCNC: 24 MMOL/L — SIGNIFICANT CHANGE UP (ref 22–31)
CORTIS PRE/P CHAL SERPL-SCNC: SIGNIFICANT CHANGE UP
CREAT SERPL-MCNC: 0.66 MG/DL — SIGNIFICANT CHANGE UP (ref 0.5–1.3)
EOSINOPHIL # BLD AUTO: 0.01 K/UL — SIGNIFICANT CHANGE UP (ref 0–0.5)
EOSINOPHIL NFR BLD AUTO: 0.1 % — SIGNIFICANT CHANGE UP (ref 0–6)
FERRITIN SERPL-MCNC: 213 NG/ML — SIGNIFICANT CHANGE UP (ref 30–400)
GLUCOSE SERPL-MCNC: 121 MG/DL — HIGH (ref 70–99)
HCT VFR BLD CALC: 32.9 % — LOW (ref 39–50)
HGB BLD-MCNC: 11.4 G/DL — LOW (ref 13–17)
IMM GRANULOCYTES NFR BLD AUTO: 0.4 % — SIGNIFICANT CHANGE UP (ref 0–1.5)
LYMPHOCYTES # BLD AUTO: 1.87 K/UL — SIGNIFICANT CHANGE UP (ref 1–3.3)
LYMPHOCYTES # BLD AUTO: 25.7 % — SIGNIFICANT CHANGE UP (ref 13–44)
MAGNESIUM SERPL-MCNC: 2.5 MG/DL — SIGNIFICANT CHANGE UP (ref 1.6–2.6)
MCHC RBC-ENTMCNC: 28.6 PG — SIGNIFICANT CHANGE UP (ref 27–34)
MCHC RBC-ENTMCNC: 34.7 GM/DL — SIGNIFICANT CHANGE UP (ref 32–36)
MCV RBC AUTO: 82.7 FL — SIGNIFICANT CHANGE UP (ref 80–100)
MONOCYTES # BLD AUTO: 0.49 K/UL — SIGNIFICANT CHANGE UP (ref 0–0.9)
MONOCYTES NFR BLD AUTO: 6.7 % — SIGNIFICANT CHANGE UP (ref 2–14)
NEUTROPHILS # BLD AUTO: 4.85 K/UL — SIGNIFICANT CHANGE UP (ref 1.8–7.4)
NEUTROPHILS NFR BLD AUTO: 66.8 % — SIGNIFICANT CHANGE UP (ref 43–77)
NRBC # BLD: 0 /100 WBCS — SIGNIFICANT CHANGE UP (ref 0–0)
PHOSPHATE SERPL-MCNC: 2.8 MG/DL — SIGNIFICANT CHANGE UP (ref 2.5–4.5)
PLATELET # BLD AUTO: 350 K/UL — SIGNIFICANT CHANGE UP (ref 150–400)
POTASSIUM SERPL-MCNC: 4.1 MMOL/L — SIGNIFICANT CHANGE UP (ref 3.5–5.3)
POTASSIUM SERPL-SCNC: 4.1 MMOL/L — SIGNIFICANT CHANGE UP (ref 3.5–5.3)
PROT SERPL-MCNC: 7.3 G/DL — SIGNIFICANT CHANGE UP (ref 6–8.3)
RBC # BLD: 3.98 M/UL — LOW (ref 4.2–5.8)
RBC # FLD: 13.6 % — SIGNIFICANT CHANGE UP (ref 10.3–14.5)
SODIUM SERPL-SCNC: 135 MMOL/L — SIGNIFICANT CHANGE UP (ref 135–145)
WBC # BLD: 7.27 K/UL — SIGNIFICANT CHANGE UP (ref 3.8–10.5)
WBC # FLD AUTO: 7.27 K/UL — SIGNIFICANT CHANGE UP (ref 3.8–10.5)

## 2021-08-18 PROCEDURE — 97116 GAIT TRAINING THERAPY: CPT

## 2021-08-18 PROCEDURE — 84436 ASSAY OF TOTAL THYROXINE: CPT

## 2021-08-18 PROCEDURE — 83550 IRON BINDING TEST: CPT

## 2021-08-18 PROCEDURE — 82024 ASSAY OF ACTH: CPT

## 2021-08-18 PROCEDURE — 82533 TOTAL CORTISOL: CPT

## 2021-08-18 PROCEDURE — 80048 BASIC METABOLIC PNL TOTAL CA: CPT

## 2021-08-18 PROCEDURE — 93005 ELECTROCARDIOGRAM TRACING: CPT

## 2021-08-18 PROCEDURE — 84295 ASSAY OF SERUM SODIUM: CPT

## 2021-08-18 PROCEDURE — 36415 COLL VENOUS BLD VENIPUNCTURE: CPT

## 2021-08-18 PROCEDURE — 82962 GLUCOSE BLOOD TEST: CPT

## 2021-08-18 PROCEDURE — 86803 HEPATITIS C AB TEST: CPT

## 2021-08-18 PROCEDURE — 0225U NFCT DS DNA&RNA 21 SARSCOV2: CPT

## 2021-08-18 PROCEDURE — 83540 ASSAY OF IRON: CPT

## 2021-08-18 PROCEDURE — 70450 CT HEAD/BRAIN W/O DYE: CPT

## 2021-08-18 PROCEDURE — 84550 ASSAY OF BLOOD/URIC ACID: CPT

## 2021-08-18 PROCEDURE — 82530 CORTISOL FREE: CPT

## 2021-08-18 PROCEDURE — 97162 PT EVAL MOD COMPLEX 30 MIN: CPT

## 2021-08-18 PROCEDURE — 82436 ASSAY OF URINE CHLORIDE: CPT

## 2021-08-18 PROCEDURE — 96374 THER/PROPH/DIAG INJ IV PUSH: CPT

## 2021-08-18 PROCEDURE — 82728 ASSAY OF FERRITIN: CPT

## 2021-08-18 PROCEDURE — 85025 COMPLETE CBC W/AUTO DIFF WBC: CPT

## 2021-08-18 PROCEDURE — 96375 TX/PRO/DX INJ NEW DRUG ADDON: CPT

## 2021-08-18 PROCEDURE — 81001 URINALYSIS AUTO W/SCOPE: CPT

## 2021-08-18 PROCEDURE — 83036 HEMOGLOBIN GLYCOSYLATED A1C: CPT

## 2021-08-18 PROCEDURE — 87449 NOS EACH ORGANISM AG IA: CPT

## 2021-08-18 PROCEDURE — 83605 ASSAY OF LACTIC ACID: CPT

## 2021-08-18 PROCEDURE — 99239 HOSP IP/OBS DSCHRG MGMT >30: CPT

## 2021-08-18 PROCEDURE — 84480 ASSAY TRIIODOTHYRONINE (T3): CPT

## 2021-08-18 PROCEDURE — 70553 MRI BRAIN STEM W/O & W/DYE: CPT

## 2021-08-18 PROCEDURE — 84146 ASSAY OF PROLACTIN: CPT

## 2021-08-18 PROCEDURE — 83735 ASSAY OF MAGNESIUM: CPT

## 2021-08-18 PROCEDURE — 99285 EMERGENCY DEPT VISIT HI MDM: CPT | Mod: 25

## 2021-08-18 PROCEDURE — 86769 SARS-COV-2 COVID-19 ANTIBODY: CPT

## 2021-08-18 PROCEDURE — 84560 ASSAY OF URINE/URIC ACID: CPT

## 2021-08-18 PROCEDURE — 83930 ASSAY OF BLOOD OSMOLALITY: CPT

## 2021-08-18 PROCEDURE — 82570 ASSAY OF URINE CREATININE: CPT

## 2021-08-18 PROCEDURE — 71046 X-RAY EXAM CHEST 2 VIEWS: CPT

## 2021-08-18 PROCEDURE — 83935 ASSAY OF URINE OSMOLALITY: CPT

## 2021-08-18 PROCEDURE — 84305 ASSAY OF SOMATOMEDIN: CPT

## 2021-08-18 PROCEDURE — 84443 ASSAY THYROID STIM HORMONE: CPT

## 2021-08-18 PROCEDURE — 85027 COMPLETE CBC AUTOMATED: CPT

## 2021-08-18 PROCEDURE — 84100 ASSAY OF PHOSPHORUS: CPT

## 2021-08-18 PROCEDURE — 80053 COMPREHEN METABOLIC PANEL: CPT

## 2021-08-18 PROCEDURE — 83690 ASSAY OF LIPASE: CPT

## 2021-08-18 PROCEDURE — 84300 ASSAY OF URINE SODIUM: CPT

## 2021-08-18 PROCEDURE — A9579: CPT

## 2021-08-18 RX ORDER — LEVOTHYROXINE SODIUM 125 MCG
1 TABLET ORAL
Qty: 0 | Refills: 0 | DISCHARGE

## 2021-08-18 RX ORDER — LEVOTHYROXINE SODIUM 125 MCG
1 TABLET ORAL
Qty: 30 | Refills: 0
Start: 2021-08-18 | End: 2021-09-16

## 2021-08-18 RX ADMIN — ENOXAPARIN SODIUM 40 MILLIGRAM(S): 100 INJECTION SUBCUTANEOUS at 11:50

## 2021-08-18 RX ADMIN — Medication 1: at 07:44

## 2021-08-18 RX ADMIN — Medication 100 MICROGRAM(S): at 05:27

## 2021-08-18 RX ADMIN — SODIUM CHLORIDE 2 GRAM(S): 9 INJECTION INTRAMUSCULAR; INTRAVENOUS; SUBCUTANEOUS at 05:27

## 2021-08-18 RX ADMIN — SODIUM CHLORIDE 2 GRAM(S): 9 INJECTION INTRAMUSCULAR; INTRAVENOUS; SUBCUTANEOUS at 11:50

## 2021-08-18 RX ADMIN — Medication 20 MILLIGRAM(S): at 05:27

## 2021-08-18 RX ADMIN — Medication 2: at 11:50

## 2021-08-18 RX ADMIN — ACYCLOVIR 1 APPLICATION(S): 50 OINTMENT TOPICAL at 13:09

## 2021-08-18 RX ADMIN — ACYCLOVIR 1 APPLICATION(S): 50 OINTMENT TOPICAL at 06:58

## 2021-08-18 NOTE — DISCHARGE NOTE PROVIDER - HOSPITAL COURSE
FROM ADMISSION H+P:   HPI:  62 y/o M with PMHx of T2DM, Iron deficiency anemia, Hypothyroidism presenting with hyponatremia. History as per wife, Mario Rene at bedside as pt is AAOx1-2(Unsure where is is and the year). Wife knows this is not the patient's baseline and believes he is confused/agitated because "he just wants to go home". Per wife, patient has been "feeling unwell" with nausea, poor PO intake, and fatigue for the past 3 days. Pt had visited an urgent care and was prescribed some pepcid and reglan, which provided mild relief. Today, he reportedly had 6 episodes of loose, brown colored non-formed stool. Denies any recent antibiotic use. No recent travel or sick contacts.     ED Course: s/p pepcid 20mg IVP x 1, Zofran 4mg IVP x 1, 1L ns bolus x 1  Vitals: T 98.4, HR 73, /67, O2 98 on RA  Labs: Hb 10.5, MCV 77.7, Na 116, CO2 21, , Ca 7.9, Alb 3.0, Lipase 65, Enterovirus positive  CXR(pre-tello read): Lungs appear clear   (14 Aug 2021 00:42)      ---  HOSPITAL COURSE:       T(C): 36.9 (08-18-21 @ 12:46), Max: 37.2 (08-18-21 @ 04:54)  HR: 75 (08-18-21 @ 12:46) (75 - 79)  BP: 125/82 (08-18-21 @ 12:46) (119/63 - 127/65)  RR: 18 (08-18-21 @ 12:46) (18 - 18)  SpO2: 98% (08-18-21 @ 12:46) (95% - 98%)    Physical Exam:  General: Well developed, well nourished, NAD  HEENT: NC/AT, PERRLA, EOMI B/L, moist mucous membranes   Neck: Supple, nontender, no masses  CV: RRR, +S1/S2, no murmurs, rubs or gallops  Respiratory: CTA B/L, No W/R/R  Abdominal: Soft, NT, ND +BSx4  Extremities: No C/C/E, + peripheral pulses  Neurology: Awake and alert    ---  CONSULTANTS:     ---  TIME SPENT:  I, the attending physician, was physically present for the key portions of the evaluation and management (E/M) service provided. The total amount of time spent reviewing the hospital notes, laboratory values, imaging findings, assessing/counseling the patient, discussing with consultant physicians, social work, nursing staff was -- minutes    ---  Primary care provider was made aware of plan for discharge:      [  ] NO     [  ] YES   FROM ADMISSION H+P:   HPI:  60 y/o M with PMHx of T2DM, Iron deficiency anemia, Hypothyroidism presenting with hyponatremia. History as per wife, Mario Rene at bedside as pt is AAOx1-2(Unsure where is is and the year). Wife knows this is not the patient's baseline and believes he is confused/agitated because "he just wants to go home". Per wife, patient has been "feeling unwell" with nausea, poor PO intake, and fatigue for the past 3 days. Pt had visited an urgent care and was prescribed some pepcid and reglan, which provided mild relief. Today, he reportedly had 6 episodes of loose, brown colored non-formed stool. Denies any recent antibiotic use. No recent travel or sick contacts.     ED Course: s/p pepcid 20mg IVP x 1, Zofran 4mg IVP x 1, 1L ns bolus x 1  Vitals: T 98.4, HR 73, /67, O2 98 on RA  Labs: Hb 10.5, MCV 77.7, Na 116, CO2 21, , Ca 7.9, Alb 3.0, Lipase 65, Enterovirus positive  CXR(pre-tello read): Lungs appear clear   (14 Aug 2021 00:42)      ---  HOSPITAL COURSE: Pt was admitted for acute metabolic encephalopathy POA, multifactorial from poor PO intake, hyponatremia from SIADH, found to have pituitary adenoma, adrenal insufficiency, evaluated by endocrinology, nephrology, placed on prednisone and synthroid dose increased. Sodium levels improved with salt tabs, and fluid restriction. MRI brain showed 2.8cm x 1.8cm pituitary macroadenoma. Evaluated by neuro, advised to follow up with neurovascular surgeon on discharge. Mentation also improved to baseline during hosp course. Pt evaluated by PT, SORAIDA recommended, however family refused, preferred to take pt home, advised on risk of this.     T(C): 36.9 (08-18-21 @ 12:46), Max: 37.2 (08-18-21 @ 04:54)  HR: 75 (08-18-21 @ 12:46) (75 - 79)  BP: 125/82 (08-18-21 @ 12:46) (119/63 - 127/65)  RR: 18 (08-18-21 @ 12:46) (18 - 18)  SpO2: 98% (08-18-21 @ 12:46) (95% - 98%)    Physical Exam:  General: Well developed, well nourished, NAD  HEENT: NC/AT, PERRLA, EOMI B/L, moist mucous membranes   Neck: Supple, nontender, no masses  CV: RRR, +S1/S2, no murmurs, rubs or gallops  Respiratory: CTA B/L, No W/R/R  Abdominal: Soft, NT, ND +BSx4  Extremities: No C/C/E, + peripheral pulses  Neurology: Awake and alert    ---  CONSULTANTS:   Neuro: Riky  Endo: Perlman  Nephro: Tessa  ---  TIME SPENT:  I, the attending physician, was physically present for the key portions of the evaluation and management (E/M) service provided. The total amount of time spent reviewing the hospital notes, laboratory values, imaging findings, assessing/counseling the patient, discussing with consultant physicians, social work, nursing staff was 37 minutes    ---  Primary care provider was made aware of plan for discharge:      [  ] NO     [ x ] YES

## 2021-08-18 NOTE — PROGRESS NOTE ADULT - SUBJECTIVE AND OBJECTIVE BOX
Neurology follow up note    SAMANTHA FISHER61yMale      Interval History:    Patient feels ok no new complaints.    Allergies    No Known Allergies    Intolerances        MEDICATIONS    acetaminophen   Tablet .. 650 milliGRAM(s) Oral every 6 hours PRN  acyclovir Topical 5% Ointment 1 Application(s) Topical three times a day  ALBUTerol    90 MICROgram(s) HFA Inhaler 2 Puff(s) Inhalation every 6 hours PRN  benzocaine 15 mG/menthol 3.6 mG (Sugar-Free) Lozenge 1 Lozenge Oral every 4 hours PRN  dextrose 40% Gel 15 Gram(s) Oral once  dextrose 5%. 1000 milliLiter(s) IV Continuous <Continuous>  dextrose 5%. 1000 milliLiter(s) IV Continuous <Continuous>  dextrose 50% Injectable 25 Gram(s) IV Push once  dextrose 50% Injectable 12.5 Gram(s) IV Push once  dextrose 50% Injectable 25 Gram(s) IV Push once  enoxaparin Injectable 40 milliGRAM(s) SubCutaneous daily  glucagon  Injectable 1 milliGRAM(s) IntraMuscular once  insulin lispro (ADMELOG) corrective regimen sliding scale   SubCutaneous Before meals and at bedtime  levothyroxine 100 MICROGram(s) Oral daily  lidocaine   5% Patch 1 Patch Transdermal daily  melatonin 5 milliGRAM(s) Oral at bedtime  predniSONE   Tablet 20 milliGRAM(s) Oral daily  sodium chloride 2 Gram(s) Oral every 6 hours              Vital Signs Last 24 Hrs  T(C): 37.2 (18 Aug 2021 04:54), Max: 37.2 (18 Aug 2021 04:54)  T(F): 98.9 (18 Aug 2021 04:54), Max: 98.9 (18 Aug 2021 04:54)  HR: 78 (18 Aug 2021 04:54) (73 - 79)  BP: 119/63 (18 Aug 2021 04:54) (107/68 - 127/65)  BP(mean): --  RR: 18 (18 Aug 2021 04:54) (18 - 18)  SpO2: 95% (18 Aug 2021 04:54) (95% - 97%)    REVIEW OF SYSTEMS:  Constitutional:  Positive history of fevers.  Head:  No headaches.  Eyes:  Positive history of poor vision out of the right eye.  Ears:  No ringing in the ears.  Neck:  No neck pain.  Respiratory:  No shortness of breath.  Cardiovascular:  No chest pain.  Abdomen:  Positive history of nausea, vomiting, and diarrhea.  Musculoskeletal:  Positive history of spasms.  Extremities/Neurological:  Possibly underlying neuropathy.  Genitourinary:  No burning upon urination.  All information was obtained from spouse and from the patient.    PHYSICAL EXAMINATION:    HEENT:  Head:  Normocephalic and atraumatic.  Eyes:  No scleral icterus.  Ears:  Hearing bilaterally intact.  NECK:  Supple.  RESPIRATORY:  Decreased breath sounds bilaterally.  CARDIOVASCULAR:  S1 and S2 heard.  ABDOMEN:  Soft and nontender.  EXTREMITIES:  No clubbing or cyanosis were noted.      NEUROLOGIC:  The patient was awake in bed.  Location was hospital.  Extraocular movements were intact, does have poor vision out of the right eye, blurry vision to begin with, not new.  Speech was fluent.  Smile symmetric.  Motor:  Bilateral upper 4/5, bilateral lower 3-/5.  Sensory:  Bilateral upper and lower intact to light touch.                 LABS:  CBC Full  -  ( 18 Aug 2021 09:07 )  WBC Count : 7.27 K/uL  RBC Count : 3.98 M/uL  Hemoglobin : 11.4 g/dL  Hematocrit : 32.9 %  Platelet Count - Automated : 350 K/uL  Mean Cell Volume : 82.7 fl  Mean Cell Hemoglobin : 28.6 pg  Mean Cell Hemoglobin Concentration : 34.7 gm/dL  Auto Neutrophil # : 4.85 K/uL  Auto Lymphocyte # : 1.87 K/uL  Auto Monocyte # : 0.49 K/uL  Auto Eosinophil # : 0.01 K/uL  Auto Basophil # : 0.02 K/uL  Auto Neutrophil % : 66.8 %  Auto Lymphocyte % : 25.7 %  Auto Monocyte % : 6.7 %  Auto Eosinophil % : 0.1 %  Auto Basophil % : 0.3 %      08-18    135  |  105  |  10  ----------------------------<  121<H>  4.1   |  24  |  0.66    Ca    8.7      18 Aug 2021 09:07  Phos  2.8     08-18  Mg     2.5     08-18    TPro  7.3  /  Alb  3.2<L>  /  TBili  0.4  /  DBili  x   /  AST  56<H>  /  ALT  64  /  AlkPhos  84  08-18    Hemoglobin A1C:     LIVER FUNCTIONS - ( 18 Aug 2021 09:07 )  Alb: 3.2 g/dL / Pro: 7.3 g/dL / ALK PHOS: 84 U/L / ALT: 64 U/L / AST: 56 U/L / GGT: x           Vitamin B12         RADIOLOGY        ANALYSIS AND PLAN:  This is a 61-year-old with altered mental status and pituitary macroadenoma.  For episode of altered mental status, suspect this is metabolic encephalopathy which is multifactorial secondary to underlying septic type process along with hyponatremia.  Would recommend antibiotics as needed.  Infection workup as needed.  For hyponatremia, we will correct sodium by 10 millimoles a day to help prevent central pontine myelinolysis.  For history of pituitary macroadenoma, per my conversation with spouse, the patient did see an ophthalmologist recently, had peripheral field testing done which showed no abnormalities.  We will plan for MRI imaging of the brain noted neurosurgical follow up   Would recommend endocrinology followup as needed  For history of diabetes, strict control of blood sugars.  For neuropathy, strict control of blood sugars, as necessary can try gabapentin based on the patient's pain.  Spoke with spouse, Dora, at bedside.  Her telephone number is [phone]. 8/18/21 spoke to as per her back to his normal self   LBP will try lidoderm patch   mental status better         Greater than 40 minutes of time was spent with the patient, plan of care, reviewing data, with greater than 50% of the visit was spent counseling and/or coordinating care with multidisciplinary healthcare team

## 2021-08-18 NOTE — DISCHARGE NOTE PROVIDER - CARE PROVIDER_API CALL
Walt Zarate  [facility]  [address], [address]  [place] [address]  Phone: [phone]  Fax: [phone]  Follow Up Time:     dr keith  PMD  Phone: (   )    -  Fax: (   )    -  Follow Up Time:     LONI LIU  Neurological Surgery  [address] [place] [address] [address] [place] [id number]  Phone: [phone]  Fax: [phone]  Follow Up Time:

## 2021-08-18 NOTE — PROGRESS NOTE ADULT - SUBJECTIVE AND OBJECTIVE BOX
Patient is a 61y old  Male who presents with a chief complaint of Hyponatremia (17 Aug 2021 12:02)        INTERVAL HPI/OVERNIGHT EVENTS:    MEDICATIONS  (STANDING):  acyclovir Topical 5% Ointment 1 Application(s) Topical three times a day  dextrose 40% Gel 15 Gram(s) Oral once  dextrose 5%. 1000 milliLiter(s) (50 mL/Hr) IV Continuous <Continuous>  dextrose 5%. 1000 milliLiter(s) (100 mL/Hr) IV Continuous <Continuous>  dextrose 50% Injectable 25 Gram(s) IV Push once  dextrose 50% Injectable 12.5 Gram(s) IV Push once  dextrose 50% Injectable 25 Gram(s) IV Push once  enoxaparin Injectable 40 milliGRAM(s) SubCutaneous daily  glucagon  Injectable 1 milliGRAM(s) IntraMuscular once  insulin lispro (ADMELOG) corrective regimen sliding scale   SubCutaneous Before meals and at bedtime  levothyroxine 100 MICROGram(s) Oral daily  lidocaine   5% Patch 1 Patch Transdermal daily  melatonin 5 milliGRAM(s) Oral at bedtime  predniSONE   Tablet 20 milliGRAM(s) Oral daily  sodium chloride 2 Gram(s) Oral every 6 hours    MEDICATIONS  (PRN):  acetaminophen   Tablet .. 650 milliGRAM(s) Oral every 6 hours PRN Mild Pain (1 - 3)  ALBUTerol    90 MICROgram(s) HFA Inhaler 2 Puff(s) Inhalation every 6 hours PRN Shortness of Breath and/or Wheezing  benzocaine 15 mG/menthol 3.6 mG (Sugar-Free) Lozenge 1 Lozenge Oral every 4 hours PRN Sore Throat      Allergies    No Known Allergies    Intolerances          Vital Signs Last 24 Hrs  T(C): 37.2 (18 Aug 2021 04:54), Max: 37.2 (18 Aug 2021 04:54)  T(F): 98.9 (18 Aug 2021 04:54), Max: 98.9 (18 Aug 2021 04:54)  HR: 78 (18 Aug 2021 04:54) (73 - 79)  BP: 119/63 (18 Aug 2021 04:54) (107/68 - 127/65)  BP(mean): --  RR: 18 (18 Aug 2021 04:54) (18 - 18)  SpO2: 95% (18 Aug 2021 04:54) (95% - 97%)    General: WN/WD NAD  Respiratory: CTA B/L  CV: RRR, S1S2, no murmurs, rubs or gallops  Abdominal: Soft, NT, ND +BS, Last BM  Extremities: No edema, + peripheral pulses    LABS:                        10.4   7.27  )-----------( 260      ( 17 Aug 2021 07:54 )             28.9     08-17    126<L>  |  96  |  9   ----------------------------<  76  3.9   |  22  |  0.50    Ca    8.1<L>      17 Aug 2021 07:54  Phos  3.1     08-17  Mg     2.2     08-17    TPro  6.5  /  Alb  2.9<L>  /  TBili  0.5  /  DBili  x   /  AST  88<H>  /  ALT  63  /  AlkPhos  76  08-17    CAPILLARY BLOOD GLUCOSE      POCT Blood Glucose.: 151 mg/dL (18 Aug 2021 07:40)  POCT Blood Glucose.: 209 mg/dL (17 Aug 2021 21:08)  POCT Blood Glucose.: 199 mg/dL (17 Aug 2021 16:26)  POCT Blood Glucose.: 130 mg/dL (17 Aug 2021 11:49)          RADIOLOGY & ADDITIONAL TESTS:

## 2021-08-18 NOTE — PROGRESS NOTE ADULT - ASSESSMENT
Hyponatremia: SIADH, Adrenal insufficiency  Hypertension  Pituitary macroadenoma  Change in mental status  Diabetes    Clinically better. On steroids. Sodium levels better. Monitor BP trend. Titrate BP meds as needed.   Monitor blood sugar levels. Insulin coverage as needed. Endocrine follow up.

## 2021-08-18 NOTE — DISCHARGE NOTE NURSING/CASE MANAGEMENT/SOCIAL WORK - NSDCPEFALRISK_GEN_ALL_CORE
For information on Fall & injury Prevention, visit [email or web address]

## 2021-08-18 NOTE — PROGRESS NOTE ADULT - PROBLEM SELECTOR PROBLEM 1
Acute hyponatremia
Acute hyponatremia
Pituitary adenoma
Pituitary adenoma
Acute hyponatremia
Acute hyponatremia

## 2021-08-18 NOTE — DISCHARGE NOTE NURSING/CASE MANAGEMENT/SOCIAL WORK - PATIENT PORTAL LINK FT
You can access the FollowMyHealth Patient Portal offered by [facility] by registering at the following website: [email or web address]. By joining [employer]’s FollowMyHealth portal, you will also be able to view your health information using other applications (apps) compatible with our system.

## 2021-08-18 NOTE — PROGRESS NOTE ADULT - PROBLEM SELECTOR PLAN 1
started on glucocorticoids for central adrenal insuff  lt4 increased for goal ft4 mid normal range of reference range  cont monitoring Na, electrolytes  [facility]  f/u LH, FSH, testosterone

## 2021-08-18 NOTE — DISCHARGE NOTE PROVIDER - NSDCCPCAREPLAN_GEN_ALL_CORE_FT
PRINCIPAL DISCHARGE DIAGNOSIS  Diagnosis: Acute hyponatremia  Assessment and Plan of Treatment: Pt was admitted for acute metabolic encephalopathy POA, multifactorial from poor PO intake, hyponatremia from SIADH, found to have pituitary adenoma, adrenal insufficiency, evaluated by endocrinology, nephrology, placed on prednisone and synthroid dose increased. Sodium levels improved with salt tabs, and fluid restriction. Pt evaluated by PT, SORAIDA recommended, however family refused, preferred to take pt home.      SECONDARY DISCHARGE DIAGNOSES  Diagnosis: Pituitary adenoma  Assessment and Plan of Treatment: MRI brain showed 2.8cm x 1.8cm pituitary macroadenoma. Evaluated by neuro, advised to follow up with neurovascular surgeon on discharge. Mentation also improved to baseline during hosp course. Synthroid dose increased, take new dosage. Start taking prednisone 20mg daily.

## 2021-08-18 NOTE — DISCHARGE NOTE PROVIDER - PROVIDER TOKENS
PROVIDER:[TOKEN:[[id number]]],FREE:[LAST:[parker],FIRST:[dr],PHONE:[(   )    -],FAX:[(   )    -],ADDRESS:[PMD]],PROVIDER:[TOKEN:[[id number]]]

## 2021-08-18 NOTE — PROGRESS NOTE ADULT - PROBLEM SELECTOR PROBLEM 2
Type 2 diabetes mellitus
Type 2 diabetes mellitus
Metabolic encephalopathy

## 2021-08-18 NOTE — PROGRESS NOTE ADULT - SUBJECTIVE AND OBJECTIVE BOX
Patient is a 61y old  Male who presents with a chief complaint of Hyponatremia (15 Aug 2021 12:54)  Patient seen in follow up for hyponatremia.        PAST MEDICAL HISTORY:  Diabetes    Anemia    Hypothyroidism      MEDICATIONS  (STANDING):  acyclovir Topical 5% Ointment 1 Application(s) Topical three times a day  dextrose 40% Gel 15 Gram(s) Oral once  dextrose 5%. 1000 milliLiter(s) (50 mL/Hr) IV Continuous <Continuous>  dextrose 5%. 1000 milliLiter(s) (100 mL/Hr) IV Continuous <Continuous>  dextrose 50% Injectable 25 Gram(s) IV Push once  dextrose 50% Injectable 12.5 Gram(s) IV Push once  dextrose 50% Injectable 25 Gram(s) IV Push once  enoxaparin Injectable 40 milliGRAM(s) SubCutaneous daily  glucagon  Injectable 1 milliGRAM(s) IntraMuscular once  insulin lispro (ADMELOG) corrective regimen sliding scale   SubCutaneous Before meals and at bedtime  levothyroxine 100 MICROGram(s) Oral daily  lidocaine   5% Patch 1 Patch Transdermal daily  melatonin 5 milliGRAM(s) Oral at bedtime  predniSONE   Tablet 20 milliGRAM(s) Oral daily  sodium chloride 2 Gram(s) Oral every 6 hours    MEDICATIONS  (PRN):  acetaminophen   Tablet .. 650 milliGRAM(s) Oral every 6 hours PRN Mild Pain (1 - 3)  ALBUTerol    90 MICROgram(s) HFA Inhaler 2 Puff(s) Inhalation every 6 hours PRN Shortness of Breath and/or Wheezing  benzocaine 15 mG/menthol 3.6 mG (Sugar-Free) Lozenge 1 Lozenge Oral every 4 hours PRN Sore Throat    T(C): 37.2 (08-18-21 @ 04:54), Max: 37.2 (08-18-21 @ 04:54)  HR: 78 (08-18-21 @ 04:54) (66 - 85)  BP: 119/63 (08-18-21 @ 04:54) (92/49 - 143/65)  RR: 18 (08-18-21 @ 04:54)  SpO2: 95% (08-18-21 @ 04:54)  Wt(kg): --  I&O's Detail    17 Aug 2021 07:01  -  18 Aug 2021 07:00  --------------------------------------------------------  IN:  Total IN: 0 mL    OUT:    Indwelling Catheter - Urethral (mL): 3500 mL  Total OUT: 3500 mL    Total NET: -3500 mL                  PHYSICAL EXAM:  General: No distress  Respiratory: b/l air entry  Cardiovascular: S1 S2  Gastrointestinal: soft  Extremities:  no edema                          LABORATORY:                        11.4   7.27  )-----------( 350      ( 18 Aug 2021 09:07 )             32.9     08-18    135  |  105  |  10  ----------------------------<  121<H>  4.1   |  24  |  0.66    Ca    8.7      18 Aug 2021 09:07  Phos  2.8     08-18  Mg     2.5     08-18    TPro  7.3  /  Alb  3.2<L>  /  TBili  0.4  /  DBili  x   /  AST  56<H>  /  ALT  64  /  AlkPhos  84  08-18    Sodium, Serum: 135 mmol/L (08-18 @ 09:07)  Sodium, Serum: 126 mmol/L (08-17 @ 07:54)  Sodium, Serum: 125 mmol/L (08-16 @ 23:53)  Sodium, Serum: 124 mmol/L (08-16 @ 16:18)    Potassium, Serum: 4.1 mmol/L (08-18 @ 09:07)  Potassium, Serum: 3.9 mmol/L (08-17 @ 07:54)  Potassium, Serum: 4.1 mmol/L (08-16 @ 23:53)  Potassium, Serum: 4.0 mmol/L (08-16 @ 16:18)    Hemoglobin: 11.4 g/dL (08-18 @ 09:07)  Hemoglobin: 10.4 g/dL (08-17 @ 07:54)  Hemoglobin: 10.4 g/dL (08-16 @ 06:54)    Creatinine, Serum 0.66 (08-18 @ 09:07)  Creatinine, Serum 0.50 (08-17 @ 07:54)  Creatinine, Serum 0.59 (08-16 @ 23:53)  Creatinine, Serum 0.70 (08-16 @ 16:18)        LIVER FUNCTIONS - ( 18 Aug 2021 09:07 )  Alb: 3.2 g/dL / Pro: 7.3 g/dL / ALK PHOS: 84 U/L / ALT: 64 U/L / AST: 56 U/L / GGT: x

## 2021-08-18 NOTE — DISCHARGE NOTE PROVIDER - NSDCMRMEDTOKEN_GEN_ALL_CORE_FT
ferrous sulfate 324 mg (65 mg elemental iron) oral delayed release tablet: 1 tab(s) orally once a day  levothyroxine 75 mcg (0.075 mg) oral tablet: 1 tab(s) orally once a day  metFORMIN 1000 mg oral tablet: 1 tab(s) orally 2 times a day  Vitamin B12 1000 mcg oral tablet: 1 tab(s) orally once a day   ferrous sulfate 324 mg (65 mg elemental iron) oral delayed release tablet: 1 tab(s) orally once a day  levothyroxine 100 mcg (0.1 mg) oral tablet: 1 tab(s) orally once a day  metFORMIN 1000 mg oral tablet: 1 tab(s) orally 2 times a day  predniSONE 20 mg oral tablet: 1 tab(s) orally once a day  Vitamin B12 1000 mcg oral tablet: 1 tab(s) orally once a day

## 2021-08-20 LAB
CORTIS 24H UR-MRATE: 8.5 MCG/24 H — SIGNIFICANT CHANGE UP (ref 3.5–45)
CORTIS UR-MCNC: 1600 ML — SIGNIFICANT CHANGE UP
CORTIS UR-MCNC: 24 H — SIGNIFICANT CHANGE UP

## 2021-08-25 LAB
CORTICOSTEROID BINDING GLOBULIN RESULT: 2.5 MG/DL — SIGNIFICANT CHANGE UP
CORTIS F/TOTAL MFR SERPL: <2.2 % — SIGNIFICANT CHANGE UP
CORTIS SERPL-MCNC: <1 UG/DL — LOW
CORTISOL, FREE RESULT: <0.02 UG/DL — LOW

## 2021-08-26 PROBLEM — D64.9 ANEMIA, UNSPECIFIED: Chronic | Status: ACTIVE | Noted: 2021-08-13

## 2021-08-26 PROBLEM — E11.9 TYPE 2 DIABETES MELLITUS WITHOUT COMPLICATIONS: Chronic | Status: ACTIVE | Noted: 2021-08-13

## 2021-08-26 PROBLEM — E03.9 HYPOTHYROIDISM, UNSPECIFIED: Chronic | Status: ACTIVE | Noted: 2021-08-13

## 2021-09-10 ENCOUNTER — APPOINTMENT (OUTPATIENT)
Dept: NEUROSURGERY | Facility: CLINIC | Age: 61
End: 2021-09-10
